# Patient Record
Sex: FEMALE | Race: WHITE | NOT HISPANIC OR LATINO | Employment: FULL TIME | URBAN - METROPOLITAN AREA
[De-identification: names, ages, dates, MRNs, and addresses within clinical notes are randomized per-mention and may not be internally consistent; named-entity substitution may affect disease eponyms.]

---

## 2017-01-09 ENCOUNTER — OFFICE VISIT (OUTPATIENT)
Dept: FAMILY MEDICINE CLINIC | Facility: CLINIC | Age: 37
End: 2017-01-09

## 2017-01-09 VITALS
HEART RATE: 99 BPM | WEIGHT: 192.7 LBS | SYSTOLIC BLOOD PRESSURE: 118 MMHG | OXYGEN SATURATION: 99 % | DIASTOLIC BLOOD PRESSURE: 82 MMHG | TEMPERATURE: 98.1 F | BODY MASS INDEX: 32.11 KG/M2 | HEIGHT: 65 IN

## 2017-01-09 DIAGNOSIS — J01.90 ACUTE NON-RECURRENT SINUSITIS, UNSPECIFIED LOCATION: Primary | ICD-10-CM

## 2017-01-09 DIAGNOSIS — I10 ESSENTIAL HYPERTENSION: Primary | ICD-10-CM

## 2017-01-09 PROCEDURE — 99213 OFFICE O/P EST LOW 20 MIN: CPT | Performed by: INTERNAL MEDICINE

## 2017-01-09 RX ORDER — SULFAMETHOXAZOLE AND TRIMETHOPRIM 800; 160 MG/1; MG/1
1 TABLET ORAL 2 TIMES DAILY
Qty: 20 TABLET | Refills: 0 | Status: SHIPPED | OUTPATIENT
Start: 2017-01-09 | End: 2017-05-22

## 2017-01-09 NOTE — PROGRESS NOTES
"CC: Cough and congestion    Onset 12/22 with scratchy throat, but worse over past 4 days with purulent nasal d/c and sputum.  Now with cough. Trying Mucinex and Dayquil with minimal relief; Delsym help temporarily last night.    No fever or chills.    Trouble sleeping due to cough.  No SOa.    Never smoked.    Allergies   Allergen Reactions   • Mastisol [Wound Dressing Adhesive] Itching   • Levofloxacin Rash   • Oxycodone Rash       Current Outpatient Prescriptions:   •  triamterene-hydrochlorothiazide (MAXZIDE-25) 37.5-25 MG per tablet, Take 1 tablet by mouth daily. (Patient taking differently: Take 1 tablet by mouth Daily. Take 1/2 tablet daily), Disp: 90 tablet, Rfl: 3  •  valACYclovir (VALTREX) 1000 MG tablet, TAKE 2 TABLETS AT THE ONSET OF A COLD SORE AND THEN 12 HOURS LATER TAKE 2 TABLETS.  NOT TO EXCEED 4 TABLETS PER OUTBREAK, Disp: 21 tablet, Rfl: 1  •  gabapentin (NEURONTIN) 300 MG capsule, Take 1 capsule by mouth 2 (two) times a day. (Patient taking differently: Take 300 mg by mouth every night.), Disp: 60 capsule, Rfl: 4    Visit Vitals   • /82 (BP Location: Left arm, Patient Position: Sitting, Cuff Size: Large Adult)   • Pulse 99   • Temp 98.1 °F (36.7 °C) (Oral)   • Ht 65\" (165.1 cm)   • Wt 192 lb 11.2 oz (87.4 kg)   • SpO2 99%   • Breastfeeding No   • BMI 32.07 kg/m2     Physical Exam   Constitutional: She is oriented to person, place, and time and well-developed, well-nourished, and in no distress.   HENT:   Mouth/Throat: No oropharyngeal exudate.   Tympanic membranes are normal bilaterally.  Boggy turbinates bilaterally with thick drainage on the left and clear drainage on the right.  Cobblestoning of the oropharynx with thick white drainage posteriorly.  No tonsillar exudates.   Eyes: Conjunctivae are normal. No scleral icterus.   Cardiovascular: Normal rate, regular rhythm and normal heart sounds.    Pulmonary/Chest: Effort normal and breath sounds normal.   Lymphadenopathy:        Head " (right side): No preauricular and no posterior auricular adenopathy present.        Head (left side): No preauricular and no posterior auricular adenopathy present.     She has no cervical adenopathy.        Right: No supraclavicular adenopathy present.        Left: No supraclavicular adenopathy present.   Neurological: She is alert and oriented to person, place, and time.         Aislinn was seen today for sinus problem.    Diagnoses and all orders for this visit:    Acute non-recurrent sinusitis, unspecified location    Other orders  -     sulfamethoxazole-trimethoprim (BACTRIM DS,SEPTRA DS) 800-160 MG per tablet; Take 1 tablet by mouth 2 (Two) Times a Day.    She should use saline nasal rinses at least 4 times a day and every hour if she needs it that often.  Delsym to help with the cough, and she can continue taking the Mucinex with a full glass of water twice a day.  Take the Bactrim as prescribed.  Call if symptoms worsen or if new symptoms develop, or if her symptoms persist beyond the antibiotic course.

## 2017-01-09 NOTE — MR AVS SNAPSHOT
Aislinn Marie   1/9/2017 1:00 PM   Office Visit    Dept Phone:  [phone]   Encounter #:  [id number]    Provider:  Aurelio Persaud MD   Department:  [facility] INTERNAL MEDICINE                Your Full Care Plan              Today's Medication Changes          These changes are accurate as of: 1/9/17  1:36 PM.  If you have any questions, ask your nurse or doctor.               New Medication(s)Ordered:     sulfamethoxazole-trimethoprim 800-160 MG per tablet   Commonly known as:  BACTRIM DS,SEPTRA DS   Take 1 tablet by mouth 2 (Two) Times a Day.   Started by:  Aurelio Persaud MD         Stop taking medication(s)listed here:     Scopolamine 1.5 MG/3DAYS patch   Commonly known as:  TRANSDERM-SCOP (1.5 MG)   Stopped by:  Aurelio Persaud MD                Where to Get Your Medications      These medications [facility] [address] [phone] [facility] [phone] [place] [address]     Phone:  [phone]     sulfamethoxazole-trimethoprim 800-160 MG per tablet                  Your Updated Medication List          This list is accurate as of: 1/9/17  1:36 PM.  Always use your most recent med list.                gabapentin 300 MG capsule   Commonly known as:  NEURONTIN   Take 1 capsule by mouth 2 (two) times a day.       sulfamethoxazole-trimethoprim 800-160 MG per tablet   Commonly known as:  BACTRIM DS,SEPTRA DS   Take 1 tablet by mouth 2 (Two) Times a Day.       triamterene-hydrochlorothiazide 37.5-25 MG per tablet   Commonly known as:  MAXZIDE-25   Take 1 tablet by mouth daily.       valACYclovir 1000 MG tablet   Commonly known as:  VALTREX   TAKE 2 TABLETS AT THE ONSET OF A COLD SORE AND THEN 12 HOURS LATER TAKE 2 TABLETS.  NOT TO EXCEED 4 TABLETS PER OUTBREAK               You Were Diagnosed With        Codes Comments    Acute non-recurrent sinusitis,  "unspecified location    -  Primary ICD-10-CM: J01.90  ICD-9-CM: 461.9       Instructions     None    Patient Instructions History      Upcoming Appointments     Visit Type Date Time Department    ACUTE           1/9/2017  1:00 PM Service at Home [facility]    [facility] 5/16/2017 11:40 AM [facility]    OFF[facility] VISIT 5/22/2017  8:20 AM [facility]      MyChart Signup     Our records indicate that you have an active [facility] [employer] account.    You can view your After Visit Summary by going to [email or web address] and logging in with your Ch[employer] username and password.  If you don't have a [employer] username and password but a parent or guardian has access to your record, the parent or guardian should login with their own [employer] username and password and access your record to view the After Visit Summary.    If you have questions, you can email [email or web address] or call [phone] to talk to our Ch[employer] staff.  Remember, Ch[employer] is NOT to be used for urgent needs.  For medical emergencies, dial 911.               Other Info from Your Visit           Your Appointments     May 16, 2017 11:40 AM EDT   [facility] with LUKE CROUCH   [facility] INTERNAL MEDICINE (--)    [address] [place], [place] [id number]   [phone]            May 22, 2017  8:20 AM EDT   Office Visit with Aurelio Persaud MD   [facility] INTERNAL MEDICINE (--)    [address] [place], [place] [id number]   [phone]           Arrive 15 minutes prior to appointment.              Allergies     Mastisol [Wound Dressing Adhesive]  Itching    Levofloxacin  Rash    Oxycodone  Rash      Reason for Visit     Sinus Problem           Vital Signs     Blood Pressure Pulse Temperature Height Weight    118/82 (BP Location: Left arm, Patient Position: Sitting, Cuff Size: Large Adult) 99 98.1 °F (36.7 °C) (Oral) 65\" (165.1 cm) 192 lb 11.2 oz (87.4 kg)    " Oxygen Saturation Breastfeeding? Body Mass Index Smoking Status       99% No 32.07 kg/m2 Never Smoker       Problems and Diagnoses Noted     Acute non-recurrent sinusitis, unspecified location    -  Primary

## 2017-01-13 ENCOUNTER — TELEPHONE (OUTPATIENT)
Dept: FAMILY MEDICINE CLINIC | Facility: CLINIC | Age: 37
End: 2017-01-13

## 2017-01-13 RX ORDER — FLUCONAZOLE 150 MG/1
TABLET ORAL
Qty: 2 TABLET | Refills: 0 | Status: SHIPPED | OUTPATIENT
Start: 2017-01-13 | End: 2017-11-22

## 2017-01-13 NOTE — TELEPHONE ENCOUNTER
----- Message from Akilah Linda MA sent at 1/13/2017 11:48 AM EST -----  Regarding: FW: MEDICATION  Contact: [phone]  Please advise    ----- Message -----     From: Reshma Quiles     Sent: 1/13/2017  10:21 AM       To: Akilah Linda MA  Subject: MEDICATION                                       Patient called stating she was in the other day for a sinus infection. Dr. Persaud gave patient and antibiotic. She still has 5 days left of this but has developed a yeast infection and would like something to be called in for this.  Please advise.  Thank you.

## 2017-05-01 ENCOUNTER — RESULTS ENCOUNTER (OUTPATIENT)
Dept: FAMILY MEDICINE CLINIC | Facility: CLINIC | Age: 37
End: 2017-05-01

## 2017-05-01 DIAGNOSIS — I10 ESSENTIAL HYPERTENSION: ICD-10-CM

## 2017-05-10 DIAGNOSIS — I10 ESSENTIAL HYPERTENSION: Primary | ICD-10-CM

## 2017-05-15 ENCOUNTER — RESULTS ENCOUNTER (OUTPATIENT)
Dept: FAMILY MEDICINE CLINIC | Facility: CLINIC | Age: 37
End: 2017-05-15

## 2017-05-15 DIAGNOSIS — I10 ESSENTIAL HYPERTENSION: ICD-10-CM

## 2017-05-16 ENCOUNTER — LAB (OUTPATIENT)
Dept: LAB | Facility: HOSPITAL | Age: 37
End: 2017-05-16

## 2017-05-16 DIAGNOSIS — I10 ESSENTIAL HYPERTENSION, MALIGNANT: Primary | ICD-10-CM

## 2017-05-16 LAB
ALBUMIN SERPL-MCNC: 4 G/DL (ref 3.5–5.2)
ALBUMIN/GLOB SERPL: 1.3 G/DL
ALP SERPL-CCNC: 68 U/L (ref 39–117)
ALT SERPL W P-5'-P-CCNC: 40 U/L (ref 1–33)
ANION GAP SERPL CALCULATED.3IONS-SCNC: 10.9 MMOL/L
AST SERPL-CCNC: 26 U/L (ref 1–32)
BILIRUB SERPL-MCNC: 0.2 MG/DL (ref 0.1–1.2)
BUN BLD-MCNC: 14 MG/DL (ref 6–20)
BUN/CREAT SERPL: 16.9 (ref 7–25)
CALCIUM SPEC-SCNC: 9.2 MG/DL (ref 8.6–10.5)
CHLORIDE SERPL-SCNC: 102 MMOL/L (ref 98–107)
CO2 SERPL-SCNC: 28.1 MMOL/L (ref 22–29)
CREAT BLD-MCNC: 0.83 MG/DL (ref 0.57–1)
GFR SERPL CREATININE-BSD FRML MDRD: 77 ML/MIN/1.73
GLOBULIN UR ELPH-MCNC: 3.2 GM/DL
GLUCOSE BLD-MCNC: 102 MG/DL (ref 65–99)
POTASSIUM BLD-SCNC: 3.7 MMOL/L (ref 3.5–5.2)
PROT SERPL-MCNC: 7.2 G/DL (ref 6–8.5)
SODIUM BLD-SCNC: 141 MMOL/L (ref 136–145)

## 2017-05-16 PROCEDURE — 36415 COLL VENOUS BLD VENIPUNCTURE: CPT

## 2017-05-16 PROCEDURE — 80053 COMPREHEN METABOLIC PANEL: CPT

## 2017-05-22 ENCOUNTER — OFFICE VISIT (OUTPATIENT)
Dept: FAMILY MEDICINE CLINIC | Facility: CLINIC | Age: 37
End: 2017-05-22

## 2017-05-22 VITALS
DIASTOLIC BLOOD PRESSURE: 82 MMHG | HEIGHT: 65 IN | OXYGEN SATURATION: 99 % | BODY MASS INDEX: 32.4 KG/M2 | HEART RATE: 95 BPM | SYSTOLIC BLOOD PRESSURE: 124 MMHG | WEIGHT: 194.5 LBS

## 2017-05-22 DIAGNOSIS — R73.01 IFG (IMPAIRED FASTING GLUCOSE): ICD-10-CM

## 2017-05-22 DIAGNOSIS — I10 ESSENTIAL HYPERTENSION: Primary | ICD-10-CM

## 2017-05-22 DIAGNOSIS — Z13.6 SCREENING FOR CARDIOVASCULAR CONDITION: ICD-10-CM

## 2017-05-22 LAB
EXPIRATION DATE: ABNORMAL
HBA1C MFR BLD: 4.9 % (ref 4.8–5.6)
Lab: ABNORMAL

## 2017-05-22 PROCEDURE — 36416 COLLJ CAPILLARY BLOOD SPEC: CPT | Performed by: INTERNAL MEDICINE

## 2017-05-22 PROCEDURE — 83036 HEMOGLOBIN GLYCOSYLATED A1C: CPT | Performed by: INTERNAL MEDICINE

## 2017-05-22 PROCEDURE — 99213 OFFICE O/P EST LOW 20 MIN: CPT | Performed by: INTERNAL MEDICINE

## 2017-07-11 DIAGNOSIS — I10 ESSENTIAL HYPERTENSION: ICD-10-CM

## 2017-07-11 RX ORDER — TRIAMTERENE AND HYDROCHLOROTHIAZIDE 37.5; 25 MG/1; MG/1
TABLET ORAL
Qty: 90 TABLET | Refills: 2 | Status: SHIPPED | OUTPATIENT
Start: 2017-07-11 | End: 2018-05-23

## 2017-11-16 ENCOUNTER — LAB (OUTPATIENT)
Dept: LAB | Facility: HOSPITAL | Age: 37
End: 2017-11-16

## 2017-11-16 DIAGNOSIS — Z13.6 SCREENING FOR CARDIOVASCULAR CONDITION: ICD-10-CM

## 2017-11-16 DIAGNOSIS — R73.01 IFG (IMPAIRED FASTING GLUCOSE): ICD-10-CM

## 2017-11-16 DIAGNOSIS — I10 ESSENTIAL HYPERTENSION: ICD-10-CM

## 2017-11-16 LAB
ALBUMIN SERPL-MCNC: 4.2 G/DL (ref 3.5–5.2)
ALBUMIN/GLOB SERPL: 1.3 G/DL
ALP SERPL-CCNC: 68 U/L (ref 39–117)
ALT SERPL W P-5'-P-CCNC: 20 U/L (ref 1–33)
ANION GAP SERPL CALCULATED.3IONS-SCNC: 11.9 MMOL/L
AST SERPL-CCNC: 17 U/L (ref 1–32)
BILIRUB SERPL-MCNC: 0.4 MG/DL (ref 0.1–1.2)
BUN BLD-MCNC: 12 MG/DL (ref 6–20)
BUN/CREAT SERPL: 15.2 (ref 7–25)
CALCIUM SPEC-SCNC: 9.5 MG/DL (ref 8.6–10.5)
CHLORIDE SERPL-SCNC: 102 MMOL/L (ref 98–107)
CHOLEST SERPL-MCNC: 166 MG/DL (ref 0–200)
CO2 SERPL-SCNC: 29.1 MMOL/L (ref 22–29)
CREAT BLD-MCNC: 0.79 MG/DL (ref 0.57–1)
GFR SERPL CREATININE-BSD FRML MDRD: 81 ML/MIN/1.73
GLOBULIN UR ELPH-MCNC: 3.2 GM/DL
GLUCOSE BLD-MCNC: 99 MG/DL (ref 65–99)
HDLC SERPL-MCNC: 58 MG/DL (ref 40–60)
LDLC SERPL CALC-MCNC: 97 MG/DL (ref 0–100)
LDLC/HDLC SERPL: 1.67 {RATIO}
POTASSIUM BLD-SCNC: 4.1 MMOL/L (ref 3.5–5.2)
PROT SERPL-MCNC: 7.4 G/DL (ref 6–8.5)
SODIUM BLD-SCNC: 143 MMOL/L (ref 136–145)
TRIGL SERPL-MCNC: 55 MG/DL (ref 0–150)
VLDLC SERPL-MCNC: 11 MG/DL (ref 5–40)

## 2017-11-16 PROCEDURE — 80061 LIPID PANEL: CPT

## 2017-11-16 PROCEDURE — 80053 COMPREHEN METABOLIC PANEL: CPT

## 2017-11-16 PROCEDURE — 36415 COLL VENOUS BLD VENIPUNCTURE: CPT

## 2017-11-22 ENCOUNTER — OFFICE VISIT (OUTPATIENT)
Dept: FAMILY MEDICINE CLINIC | Facility: CLINIC | Age: 37
End: 2017-11-22

## 2017-11-22 VITALS
OXYGEN SATURATION: 98 % | HEART RATE: 87 BPM | BODY MASS INDEX: 29.42 KG/M2 | SYSTOLIC BLOOD PRESSURE: 124 MMHG | DIASTOLIC BLOOD PRESSURE: 80 MMHG | WEIGHT: 176.6 LBS | HEIGHT: 65 IN

## 2017-11-22 DIAGNOSIS — Z23 NEED FOR HEPATITIS A VACCINATION: ICD-10-CM

## 2017-11-22 DIAGNOSIS — I10 ESSENTIAL HYPERTENSION: Primary | ICD-10-CM

## 2017-11-22 PROCEDURE — 90471 IMMUNIZATION ADMIN: CPT | Performed by: INTERNAL MEDICINE

## 2017-11-22 PROCEDURE — 99213 OFFICE O/P EST LOW 20 MIN: CPT | Performed by: INTERNAL MEDICINE

## 2017-11-22 PROCEDURE — 90632 HEPA VACCINE ADULT IM: CPT | Performed by: INTERNAL MEDICINE

## 2017-11-22 RX ORDER — VALACYCLOVIR HYDROCHLORIDE 1 G/1
1000 TABLET, FILM COATED ORAL 3 TIMES DAILY
Qty: 21 TABLET | Refills: 1 | Status: SHIPPED | OUTPATIENT
Start: 2017-11-22 | End: 2019-01-08 | Stop reason: SDUPTHER

## 2017-11-22 RX ORDER — PHENTERMINE HYDROCHLORIDE 37.5 MG/1
37.5 CAPSULE ORAL EVERY MORNING
Start: 2017-11-22 | End: 2018-05-23 | Stop reason: SDUPTHER

## 2017-11-22 NOTE — PROGRESS NOTES
Subjective   Aislinn Marie is a 37 y.o. female who presents today for:    Hypertension (6 month f/u & review labs)    History of Present Illness   Blood pressure was elevated at a gynecologist visit in March, 2016.  Repeat testing through our office visits showed persistent elevation, so she was started on Maxide 37.5/25.  She developed orthostatic symptoms, so was cut the dose in half in November, 2016.  Blood pressure was fine at her ov in 5/2017.  Blood pressure readings have been normal at home.  She has moved the dose to the afternoon to avoid mild orthostatic symptoms during the day.    Her father-in-law, who has end-stage liver disease due to alcoholism, was recently diagnosed with hepatitis a.  She is inquiring about getting the hepatitis A vaccine.    She started taking Phentermine in August for weight loss and has lost 20 lbs.      She works as a surgical assistant.    Ms. Marie  reports that she has never smoked. She has never used smokeless tobacco. She reports that she drinks alcohol. She reports that she does not use illicit drugs.     Allergies   Allergen Reactions   • Mastisol [Wound Dressing Adhesive] Itching   • Levofloxacin Rash   • Oxycodone Rash       Current Outpatient Prescriptions:   •  valACYclovir (VALTREX) 1000 MG tablet, TAKE 2 TABLETS AT THE ONSET OF A COLD SORE AND THEN 12 HOURS LATER TAKE 2 TABLETS.  NOT TO EXCEED 4 TABLETS PER OUTBREAK, Disp: 21 tablet, Rfl: 1  •  phentermine 37.5 MG capsule, Take 1 capsule by mouth Every Morning., Disp: , Rfl:   •  triamterene-hydrochlorothiazide (MAXZIDE-25) 37.5-25 MG per tablet, TAKE ONE TABLET BY MOUTH DAILY (Patient taking differently: TAKE 1/2 TABLET BY MOUTH DAILY), Disp: 90 tablet, Rfl: 2      Review of Systems   Constitutional:        20 lb weight loss by design   Eyes: Negative for visual disturbance.   Cardiovascular: Negative for chest pain.   Musculoskeletal: Negative for myalgias.   Neurological: Negative for light-headedness.  "      Objective   Vitals:    11/22/17 0827 11/22/17 0843   BP: 140/96 124/80   BP Location: Right arm Left arm   Patient Position: Sitting    Cuff Size: Large Adult Large Adult   Pulse: 87    SpO2: 98%    Weight: 176 lb 9.6 oz (80.1 kg)    Height: 65\" (165.1 cm)      Physical Exam   Constitutional: She is oriented to person, place, and time. She appears well-developed and well-nourished.   Eyes: Conjunctivae are normal. No scleral icterus.   Cardiovascular: Normal rate and regular rhythm.    Abdominal: Bowel sounds are normal.   Neurological: She is alert and oriented to person, place, and time.   Psychiatric: She has a normal mood and affect.       Assessment/Plan   Aislinn was seen today for hypertension.    Diagnoses and all orders for this visit:    Essential hypertension    Need for Hepatitis A vaccine    She is doing very well in this regard.  She will continue the regular exercise and the diet changes that have her weight down 20 pounds.  If she moves off phentermine and maintains her weight, we may be able to stop the blood pressure medicine altogether.  She will call us if she begins developing orthostatic hypotension symptoms again.  Otherwise we will see how she's doing in 6 months.    We will give her hepatitis A vaccine #2 in 6 months.    -     phentermine 37.5 MG capsule; Take 1 capsule by mouth Every Morning added to Rx list, prescribed by a weight loss clinic.    10 minutes of the 15 minute office visit were spent counseling the patient regarding phentermine's potential effect on her blood pressure; recommended course of treatment for phentermine; indications for hepatitis A vaccine; the indications, risks and benefits of hepatitis A vaccine.  "

## 2017-12-06 RX ORDER — VALACYCLOVIR HYDROCHLORIDE 1 G/1
TABLET, FILM COATED ORAL
Qty: 21 TABLET | Refills: 0 | OUTPATIENT
Start: 2017-12-06

## 2018-04-10 ENCOUNTER — OFFICE VISIT (OUTPATIENT)
Dept: OBSTETRICS AND GYNECOLOGY | Facility: CLINIC | Age: 38
End: 2018-04-10

## 2018-04-10 ENCOUNTER — PROCEDURE VISIT (OUTPATIENT)
Dept: OBSTETRICS AND GYNECOLOGY | Facility: CLINIC | Age: 38
End: 2018-04-10

## 2018-04-10 VITALS
WEIGHT: 177.2 LBS | DIASTOLIC BLOOD PRESSURE: 78 MMHG | BODY MASS INDEX: 29.52 KG/M2 | SYSTOLIC BLOOD PRESSURE: 128 MMHG | HEIGHT: 65 IN

## 2018-04-10 DIAGNOSIS — R10.2 PELVIC PAIN: ICD-10-CM

## 2018-04-10 DIAGNOSIS — Z12.31 VISIT FOR SCREENING MAMMOGRAM: ICD-10-CM

## 2018-04-10 DIAGNOSIS — Z01.419 ENCOUNTER FOR WELL WOMAN EXAM WITH ROUTINE GYNECOLOGICAL EXAM: ICD-10-CM

## 2018-04-10 DIAGNOSIS — R10.2 PELVIC PAIN IN FEMALE: ICD-10-CM

## 2018-04-10 DIAGNOSIS — Z00.00 ANNUAL PHYSICAL EXAM: Primary | ICD-10-CM

## 2018-04-10 PROCEDURE — 99213 OFFICE O/P EST LOW 20 MIN: CPT | Performed by: OBSTETRICS & GYNECOLOGY

## 2018-04-10 PROCEDURE — 76830 TRANSVAGINAL US NON-OB: CPT | Performed by: OBSTETRICS & GYNECOLOGY

## 2018-04-10 PROCEDURE — 99396 PREV VISIT EST AGE 40-64: CPT | Performed by: OBSTETRICS & GYNECOLOGY

## 2018-04-10 NOTE — PROGRESS NOTES
HPI   Aislinn Marie  is a 38 y.o. female who presents for evaluation of 2 issues.  First, she is overdue for her routine gynecologic exam.  Second, she has been experiencing pelvic pain and low back pain.  She reports that for the last 6-12 months, she has experienced a dull ache in the lower pelvis and lower back.  She reports that this occurs most days, though it is not happening today.  She reports that it is made slightly worse during her menstrual cycle.  She is uncertain whether it is worsened by intercourse.  She reports that her cycle remains regular and predictable.  This has not changed.  She denies fever or chills.  Denies nausea or vomiting.  Denies changes in bowel habits.    Chief Complaint   Patient presents with   • Gynecologic Exam     ae, reg periods, having lower abd pain moving into lower back daily, ? if essure causing pain       Past Medical History:   Diagnosis Date   • Arthritis    • Back pain    • Essential hypertension 4/7/2016   • Hemorrhoid    • Low back pain    • Lumbar disc herniation    • Ocular migraine    • Varicose veins        Past Surgical History:   Procedure Laterality Date   • CHOLECYSTECTOMY      Dr. Vasquez   • ENDOVENOUS ABLATION SAPHENOUS VEIN W/ LASER      Dr. Cárdenas   • ESSURE TUBAL LIGATION  2013    Dr. CANELO Saini   • LUMBAR DISCECTOMY FUSION INSTRUMENTATION Left 6/11/2016    Procedure: LUMBAR DISCECTOMY POSTERIOR WITH METRIX, Left L4/5, LEFT DECOMPRESSION L5/S1;  Surgeon: James Washburn MD;  Location: [facility];  Service:    • MICROAMBULATORY PHLEBECTOMY      Dr. Cárdenas   • NEUROFIBROMA EXCISION     • WISDOM TOOTH EXTRACTION         Social History     Social History   • Marital status:      Spouse name: N/A   • Number of children: N/A   • Years of education: N/A     Occupational History   • Not on file.     Social History Main Topics   • Smoking status: Never Smoker   • Smokeless tobacco: Never Used   • Alcohol use Yes      Comment: wine on a rare  occasion   • Drug use: No   • Sexual activity: Yes     Partners: Male      Comment: essure     Other Topics Concern   • Not on file     Social History Narrative   • No narrative on file       The following portions of the patient's history were reviewed and updated as appropriate: allergies, current medications, past family history, past medical history, past social history, past surgical history and problem list.    Review of Systems is positive for low back pain and pelvic pain.  It is negative for menorrhagia or dyspareunia.  All other systems are reviewed and are negative          Physical Exam   Constitutional: She is oriented to person, place, and time. She appears well-developed and well-nourished.   HENT:   Head: Normocephalic and atraumatic.   Cardiovascular: Normal rate and regular rhythm.    Pulmonary/Chest: Effort normal and breath sounds normal. She has no wheezes. She has no rales.   The breasts are equal in size.  There are no palpable lumps.  Nipple discharge and axillary adenopathy are absent.   Abdominal: Soft. She exhibits no distension. There is no tenderness.   Genitourinary: There is no lesion on the right labia. There is no lesion on the left labia.   Genitourinary Comments: Normal female external genitalia  The vagina is estrogenized and without visible lesion  Cervical motion tenderness is absent  The uterus is anteverted and slightly enlarged.  It is nontender.  No adnexal masses are palpable.  No adnexal tenderness.   Neurological: She is alert and oriented to person, place, and time.   Skin: Skin is warm and dry.   Nursing note and vitals reviewed.      Assessment    Aislinn was seen today for gynecologic exam.    Diagnoses and all orders for this visit:    Annual physical exam    Pelvic pain  -     US Non-ob Transvaginal        Plan  1. Normal gynecologic exam  2. Family history of breast cancer.  Counsel[place] regarding [place] [facility] [place] for mammography every 1-2 years between the ages  of 40 and 50 depending on patient preference and family history.  We discussed options and the patient would like to proceed with a mammogram.  We will help her to arrange this.  3. Pelvic pain and low back pain.  Counseled.  15 minutes of a 30 minute visit were spent in direct face-to-face counseling on this issue.  The physical examination is positive only for an anteverted and slightly enlarged uterus.  The patient is not tender over her adnexa.  She has several possible causes of her pain including a possible ovarian cyst or endometrial polyp.  The patient is concerned regarding her Essure being a possible source.  The Essure was placed in 2013 and the pain did not begin until 2017.  I feel that this is unlikely as a cause.  Other potential causes include worsening of the patient's chronic low back pain versus spastic colon, irritable bowel or inflammatory bowel disease.  We discussed all these possibilities.  Workup will begin with an ultrasound to visualize the endometrium, the adnexa.  If this is negative, we can follow up with a CT scan to visualize the issue or coils.  If all this is negative, I would recommend follow-up with a gastroenterologist for further workup.  If a gynecologic cause is found, attention will be focused on it.    4. No Follow-up on file.    History   Smoking Status   • Never Smoker   5.     6.

## 2018-04-12 ENCOUNTER — DOCUMENTATION (OUTPATIENT)
Dept: OBSTETRICS AND GYNECOLOGY | Facility: CLINIC | Age: 38
End: 2018-04-12

## 2018-04-12 DIAGNOSIS — R10.2 PELVIC PAIN: Primary | ICD-10-CM

## 2018-04-12 NOTE — PROGRESS NOTES
Phone call.  Ultrasound results were reviewed with the patient and her questions were answered.  The uterus is normal in size.  The endometrium is normal.  There are no adnexal masses.  The Essure coils are visualized on ultrasound.  This appears normal study.  We discussed the possibility that the patient's pelvic and low back pain could be an extension of her chronic low back pain or could also be related to a gastrointestinal abnormality such as irritable bowel syndrome inflammatory bowel syndrome or diverticular disease.  I recommended further workup in that area.  Prior to proceeding this, the patient would like to continue looking at her GYN system.  He can perform a CT scan to evaluate the uterus, adnexa and Essure coils.  The patient would like to proceed with this.

## 2018-04-13 LAB
CONV .: NORMAL
CYTOLOGIST CVX/VAG CYTO: NORMAL
CYTOLOGY CVX/VAG DOC THIN PREP: NORMAL
DX ICD CODE: NORMAL
HIV 1 & 2 AB SER-IMP: NORMAL
OTHER STN SPEC: NORMAL
PATH REPORT.FINAL DX SPEC: NORMAL
PATHOLOGIST CVX/VAG CYTO: NORMAL
STAT OF ADQ CVX/VAG CYTO-IMP: NORMAL

## 2018-04-18 ENCOUNTER — TRANSCRIBE ORDERS (OUTPATIENT)
Dept: ADMINISTRATIVE | Facility: HOSPITAL | Age: 38
End: 2018-04-18

## 2018-04-18 DIAGNOSIS — R10.2 ADNEXAL TENDERNESS, RIGHT: Primary | ICD-10-CM

## 2018-04-19 ENCOUNTER — HOSPITAL ENCOUNTER (OUTPATIENT)
Dept: CT IMAGING | Facility: HOSPITAL | Age: 38
Discharge: HOME | End: 2018-04-19
Attending: OBSTETRICS & GYNECOLOGY | Admitting: OBSTETRICS & GYNECOLOGY

## 2018-04-19 ENCOUNTER — APPOINTMENT (OUTPATIENT)
Dept: CT IMAGING | Facility: HOSPITAL | Age: 38
End: 2018-04-19
Attending: OBSTETRICS & GYNECOLOGY

## 2018-04-19 DIAGNOSIS — R10.2 ADNEXAL TENDERNESS, RIGHT: ICD-10-CM

## 2018-04-19 LAB — CREAT BLDA-MCNC: 0.8 MG/DL (ref 0.6–1.3)

## 2018-04-19 PROCEDURE — 82565 ASSAY OF CREATININE: CPT

## 2018-04-19 PROCEDURE — 72193 CT PELVIS W/DYE: CPT

## 2018-04-19 PROCEDURE — 25010000002 IOPAMIDOL 61 % SOLUTION: Performed by: OBSTETRICS & GYNECOLOGY

## 2018-04-19 RX ADMIN — IOPAMIDOL 85 ML: 612 INJECTION, SOLUTION INTRAVENOUS at 07:35

## 2018-04-24 ENCOUNTER — DOCUMENTATION (OUTPATIENT)
Dept: OBSTETRICS AND GYNECOLOGY | Facility: CLINIC | Age: 38
End: 2018-04-24

## 2018-04-24 NOTE — PROGRESS NOTES
Phone call.  CT results were reviewed and discussed with the patient.  The uterus was normal.  Issue coils are in good position.  There is no pathology encountered on CT scan.  We discussed the possibility of a GI consult regarding possible inflammatory bowel disease, irritable bowel syndrome or diverticular disease.  The patient reports that her pain has completely resolved and she declines any further workup for now.

## 2018-04-26 ENCOUNTER — HOSPITAL ENCOUNTER (OUTPATIENT)
Dept: MAMMOGRAPHY | Facility: HOSPITAL | Age: 38
Discharge: HOME | End: 2018-04-26
Attending: OBSTETRICS & GYNECOLOGY | Admitting: OBSTETRICS & GYNECOLOGY

## 2018-04-26 DIAGNOSIS — R10.2 PELVIC PAIN: ICD-10-CM

## 2018-04-26 DIAGNOSIS — Z12.31 VISIT FOR SCREENING MAMMOGRAM: ICD-10-CM

## 2018-04-26 PROCEDURE — 77067 SCR MAMMO BI INCL CAD: CPT

## 2018-04-30 DIAGNOSIS — N64.89 BREAST ASYMMETRY: Primary | ICD-10-CM

## 2018-05-08 ENCOUNTER — APPOINTMENT (OUTPATIENT)
Dept: MAMMOGRAPHY | Facility: HOSPITAL | Age: 38
End: 2018-05-08
Attending: OBSTETRICS & GYNECOLOGY

## 2018-05-16 ENCOUNTER — HOSPITAL ENCOUNTER (OUTPATIENT)
Dept: MAMMOGRAPHY | Facility: HOSPITAL | Age: 38
Discharge: HOME | End: 2018-05-16
Attending: OBSTETRICS & GYNECOLOGY | Admitting: OBSTETRICS & GYNECOLOGY

## 2018-05-16 DIAGNOSIS — N64.89 BREAST ASYMMETRY: ICD-10-CM

## 2018-05-16 PROCEDURE — 77065 DX MAMMO INCL CAD UNI: CPT

## 2018-05-23 ENCOUNTER — OFFICE VISIT (OUTPATIENT)
Dept: FAMILY MEDICINE CLINIC | Facility: CLINIC | Age: 38
End: 2018-05-23

## 2018-05-23 VITALS
WEIGHT: 176.7 LBS | HEIGHT: 65 IN | HEART RATE: 79 BPM | OXYGEN SATURATION: 99 % | SYSTOLIC BLOOD PRESSURE: 134 MMHG | DIASTOLIC BLOOD PRESSURE: 80 MMHG | BODY MASS INDEX: 29.44 KG/M2

## 2018-05-23 DIAGNOSIS — Z23 NEED FOR HEPATITIS A VACCINATION: ICD-10-CM

## 2018-05-23 DIAGNOSIS — I10 ESSENTIAL HYPERTENSION: Primary | ICD-10-CM

## 2018-05-23 DIAGNOSIS — E66.9 OBESITY (BMI 30-39.9): ICD-10-CM

## 2018-05-23 PROCEDURE — 90471 IMMUNIZATION ADMIN: CPT | Performed by: INTERNAL MEDICINE

## 2018-05-23 PROCEDURE — 99213 OFFICE O/P EST LOW 20 MIN: CPT | Performed by: INTERNAL MEDICINE

## 2018-05-23 PROCEDURE — 90632 HEPA VACCINE ADULT IM: CPT | Performed by: INTERNAL MEDICINE

## 2018-05-23 RX ORDER — PHENTERMINE HYDROCHLORIDE 37.5 MG/1
37.5 CAPSULE ORAL EVERY MORNING
Qty: 30 CAPSULE | Refills: 0 | Status: SHIPPED | OUTPATIENT
Start: 2018-05-23 | End: 2018-06-28 | Stop reason: SDUPTHER

## 2018-06-28 ENCOUNTER — OFFICE VISIT (OUTPATIENT)
Dept: FAMILY MEDICINE CLINIC | Facility: CLINIC | Age: 38
End: 2018-06-28

## 2018-06-28 VITALS
BODY MASS INDEX: 28.79 KG/M2 | HEART RATE: 100 BPM | DIASTOLIC BLOOD PRESSURE: 92 MMHG | SYSTOLIC BLOOD PRESSURE: 138 MMHG | HEIGHT: 65 IN | OXYGEN SATURATION: 98 % | WEIGHT: 172.8 LBS

## 2018-06-28 DIAGNOSIS — E66.9 OBESITY (BMI 30-39.9): ICD-10-CM

## 2018-06-28 DIAGNOSIS — I10 ESSENTIAL HYPERTENSION: Primary | ICD-10-CM

## 2018-06-28 PROCEDURE — 99214 OFFICE O/P EST MOD 30 MIN: CPT | Performed by: INTERNAL MEDICINE

## 2018-06-28 RX ORDER — TRIAMTERENE AND HYDROCHLOROTHIAZIDE 37.5; 25 MG/1; MG/1
1 TABLET ORAL DAILY
Qty: 90 TABLET | Refills: 1 | Status: SHIPPED | OUTPATIENT
Start: 2018-06-28 | End: 2022-01-17

## 2018-06-28 RX ORDER — PHENTERMINE HYDROCHLORIDE 37.5 MG/1
37.5 CAPSULE ORAL EVERY MORNING
Qty: 30 CAPSULE | Refills: 2 | Status: SHIPPED | OUTPATIENT
Start: 2018-06-28 | End: 2019-10-02

## 2018-06-28 NOTE — PROGRESS NOTES
"Subjective   Aislinn Marie is a 38 y.o. female who presents today for:    Hypertension (1 month f/u) and Weight Loss    History of Present Illness     She had persistent elevations in her blood pressure following a gynecologist visit in 3/2016.  We started Maxide 37.5/25, but she developed orthostatic symptoms.  We cut the dose to one half tablet in 11/2016 with good blood pressure control thereafter.    She started taking phentermine and 8/2017 for weight loss, lost 20 pounds initially, and was able to come off the Maxide in 1/2018.  She was taking phentermine sporadically through the first half of this year.  When we saw her in late May, we put her back on phentermine once a day.  She has lost 4 pounds in these past 4 weeks.  She denies any untoward side effects from the phentermine.    Ms. Marie  reports that she has never smoked. She has never used smokeless tobacco. She reports that she drinks alcohol. She reports that she does not use drugs.     Allergies   Allergen Reactions   • Mastisol [Wound Dressing Adhesive] Itching   • Levofloxacin Rash   • Oxycodone Rash       Current Outpatient Prescriptions:   •  phentermine 37.5 MG capsule, Take 1 capsule by mouth Every Morning., Disp: 30 capsule, Rfl: 0  •  valACYclovir (VALTREX) 1000 MG tablet, Take 1 tablet by mouth 3 (Three) Times a Day. (Patient taking differently: Take 1,000 mg by mouth 3 (Three) Times a Day As Needed.), Disp: 21 tablet, Rfl: 1      Review of Systems   Constitutional: Negative for unexpected weight change.   Cardiovascular: Negative for chest pain and palpitations.   Psychiatric/Behavioral: Negative for sleep disturbance.         Objective   Vitals:    06/28/18 1349   BP: 138/92   BP Location: Right arm   Patient Position: Sitting   Cuff Size: Adult   Pulse: 100   SpO2: 98%   Weight: 78.4 kg (172 lb 12.8 oz)   Height: 165.1 cm (65\")     Physical Exam    Well-developed, well-nourished, in good spirits.  Regular rate and rhythm.  No murmur " appreciated.  Mood is upbeat and affect is appropriate.      Aislnin was seen today for hypertension and weight loss.    Diagnoses and all orders for this visit:    Essential hypertension- uncontrolled  -     triamterene-hydrochlorothiazide (MAXZIDE-25) 37.5-25 MG per tablet; Take 1 tablet by mouth Daily.    Obesity (BMI 30-39.9)  -     phentermine 37.5 MG capsule; Take 1 capsule by mouth Every Morning.    Resume the Maxide, starting with one half tablet.  We may need to increase it to a full tablet at some point down the road.  However, one half tablet has been effective for her in the past, and with continued weight loss, and the resumption of regular exercise that was encouraged today, she should be able to stick with this low dose and maybe even come off of it again.  She has done well with this dose in the past.  Labs from November, 2017, when she was taking one half of a Maxide were reviewed today and all were in order.    RACH report was obtained and reviewed during the course of today's office visit.  This shows that she is getting the phentermine has prescribed from us only.  She has responded this past month, so we will attenuate for the next 2 months.  In September, she will cut back to every other day prior to our follow-up visit at the end of September.  She knows to contact us in the interim if there are any problems otherwise.

## 2018-09-26 ENCOUNTER — OFFICE VISIT (OUTPATIENT)
Dept: FAMILY MEDICINE CLINIC | Facility: CLINIC | Age: 38
End: 2018-09-26

## 2018-09-26 VITALS
OXYGEN SATURATION: 98 % | BODY MASS INDEX: 28.14 KG/M2 | HEART RATE: 86 BPM | SYSTOLIC BLOOD PRESSURE: 128 MMHG | HEIGHT: 65 IN | DIASTOLIC BLOOD PRESSURE: 74 MMHG | WEIGHT: 168.9 LBS

## 2018-09-26 DIAGNOSIS — I10 ESSENTIAL HYPERTENSION: Primary | ICD-10-CM

## 2018-09-26 DIAGNOSIS — Z79.899 ENCOUNTER FOR LONG-TERM (CURRENT) USE OF MEDICATIONS: ICD-10-CM

## 2018-09-26 DIAGNOSIS — E66.9 OBESITY (BMI 30-39.9): ICD-10-CM

## 2018-09-26 LAB
BUN SERPL-MCNC: 11 MG/DL (ref 6–20)
BUN/CREAT SERPL: 12.1 (ref 7–25)
CALCIUM SERPL-MCNC: 9.7 MG/DL (ref 8.6–10.5)
CHLORIDE SERPL-SCNC: 100 MMOL/L (ref 98–107)
CO2 SERPL-SCNC: 32 MMOL/L (ref 22–29)
CREAT SERPL-MCNC: 0.91 MG/DL (ref 0.57–1)
GLUCOSE SERPL-MCNC: 84 MG/DL (ref 65–99)
POTASSIUM SERPL-SCNC: 3.6 MMOL/L (ref 3.5–5.2)
SODIUM SERPL-SCNC: 141 MMOL/L (ref 136–145)

## 2018-09-26 PROCEDURE — 99213 OFFICE O/P EST LOW 20 MIN: CPT | Performed by: INTERNAL MEDICINE

## 2018-09-26 RX ORDER — NITROFURANTOIN 25; 75 MG/1; MG/1
100 CAPSULE ORAL 2 TIMES DAILY
COMMUNITY
Start: 2018-09-24 | End: 2018-09-28

## 2018-09-26 NOTE — PROGRESS NOTES
Subjective   Aislinn Marie is a 38 y.o. female who presents today for:    Hypertension (3 month f/u ) and Weight Loss (CSE)    History of Present Illness     She has chronic, benign, essential hypertension that has improved with weight loss.  She takes Maxide, having cut the dose back to one half tablet daily with good control.  She checks her blood pressures at home.  She denies any side effects from the medication.    She has also struggled with her weight.  She was tried on phentermine with good benefit.  She then came off of it, started to regain weight, so restarted it earlier this year.  She is now weaning off of it, having lost about 30 pounds over the last 2 years.  She only has trouble with insomnia if she takes it late in the evening.    Ms. Marie  reports that she has never smoked. She has never used smokeless tobacco. She reports that she drinks alcohol. She reports that she does not use drugs.     Allergies   Allergen Reactions   • Mastisol [Wound Dressing Adhesive] Itching   • Levofloxacin Rash   • Oxycodone Rash       Current Outpatient Prescriptions:   •  nitrofurantoin, macrocrystal-monohydrate, (MACROBID) 100 MG capsule, Take 100 mg by mouth 2 (Two) Times a Day., Disp: , Rfl:   •  phentermine 37.5 MG capsule, Take 1 capsule by mouth Every Morning., Disp: 30 capsule, Rfl: 2  •  triamterene-hydrochlorothiazide (MAXZIDE-25) 37.5-25 MG per tablet, Take 1 tablet by mouth Daily., Disp: 90 tablet, Rfl: 1  •  valACYclovir (VALTREX) 1000 MG tablet, Take 1 tablet by mouth 3 (Three) Times a Day. (Patient taking differently: Take 1,000 mg by mouth 3 (Three) Times a Day As Needed.), Disp: 21 tablet, Rfl: 1      Review of Systems   Constitutional: Negative for unexpected weight change.   Cardiovascular: Positive for leg swelling (Intermittent, late day swelling). Negative for palpitations.   Psychiatric/Behavioral: Negative for sleep disturbance.         Objective   Vitals:    09/26/18 0948 09/26/18 1002  "  BP: 126/80 128/74   BP Location: Right arm Left arm   Patient Position: Sitting    Cuff Size: Adult Adult   Pulse: 86    SpO2: 98%    Weight: 76.6 kg (168 lb 14.4 oz)    Height: 165.1 cm (65\")      Physical Exam  Well-developed, well-nourished, in good spirits.  Mood is upbeat and affect is appropriate.  Insight and judgment are intact.  No thyromegaly or mass.  No carotid bruit.  Regular rate and rhythm.  No murmur noted.  Normal S1 and S2.  No lower extremity edema.        Aislinn was seen today for hypertension and weight loss.    Diagnoses and all orders for this visit:    Essential hypertension  -     Basic Metabolic Panel  Blood pressure is doing well on the Maxide (one half tablet once daily), and with the lifestyle changes that have her weight down 30 pounds over the last 2 years.  She will continue to monitor it at home.    Obesity (BMI 30-39.9), now doing better at 28.1  To wean off the phentermine this month.  Call if the weight starts to climb and crosses the 172 pound lucia.  If that's the case, we will restart phentermine, see her in follow-up one month later, and proceed from there, anticipating treating her for another 3 months before weaning off of it again.        "

## 2018-10-01 ENCOUNTER — APPOINTMENT (OUTPATIENT)
Dept: CT IMAGING | Facility: HOSPITAL | Age: 38
End: 2018-10-01

## 2018-10-01 ENCOUNTER — HOSPITAL ENCOUNTER (EMERGENCY)
Facility: HOSPITAL | Age: 38
Discharge: HOME | End: 2018-10-02
Attending: EMERGENCY MEDICINE | Admitting: EMERGENCY MEDICINE

## 2018-10-01 DIAGNOSIS — R10.10 UPPER ABDOMINAL PAIN: Primary | ICD-10-CM

## 2018-10-01 LAB
ALBUMIN SERPL-MCNC: 4.3 G/DL (ref 3.5–5.2)
ALBUMIN/GLOB SERPL: 1.3 G/DL
ALP SERPL-CCNC: 62 U/L (ref 39–117)
ALT SERPL W P-5'-P-CCNC: 14 U/L (ref 1–33)
ANION GAP SERPL CALCULATED.3IONS-SCNC: 12.1 MMOL/L
AST SERPL-CCNC: 17 U/L (ref 1–32)
BASOPHILS # BLD AUTO: 0.02 10*3/MM3 (ref 0–0.2)
BASOPHILS NFR BLD AUTO: 0.2 % (ref 0–1.5)
BILIRUB SERPL-MCNC: 0.5 MG/DL (ref 0.1–1.2)
BUN BLD-MCNC: 11 MG/DL (ref 6–20)
BUN/CREAT SERPL: 14.9 (ref 7–25)
CALCIUM SPEC-SCNC: 10 MG/DL (ref 8.6–10.5)
CHLORIDE SERPL-SCNC: 101 MMOL/L (ref 98–107)
CO2 SERPL-SCNC: 27.9 MMOL/L (ref 22–29)
CREAT BLD-MCNC: 0.74 MG/DL (ref 0.57–1)
DEPRECATED RDW RBC AUTO: 38.9 FL (ref 37–54)
DRUGS UR: NORMAL
EOSINOPHIL # BLD AUTO: 0.04 10*3/MM3 (ref 0–0.7)
EOSINOPHIL NFR BLD AUTO: 0.4 % (ref 0.3–6.2)
ERYTHROCYTE [DISTWIDTH] IN BLOOD BY AUTOMATED COUNT: 12.1 % (ref 11.7–13)
GFR SERPL CREATININE-BSD FRML MDRD: 87 ML/MIN/1.73
GLOBULIN UR ELPH-MCNC: 3.2 GM/DL
GLUCOSE BLD-MCNC: 92 MG/DL (ref 65–99)
HCT VFR BLD AUTO: 36.7 % (ref 35.6–45.5)
HGB BLD-MCNC: 13 G/DL (ref 11.9–15.5)
IMM GRANULOCYTES # BLD: 0.02 10*3/MM3 (ref 0–0.03)
IMM GRANULOCYTES NFR BLD: 0.2 % (ref 0–0.5)
LIPASE SERPL-CCNC: 39 U/L (ref 13–60)
LYMPHOCYTES # BLD AUTO: 2.18 10*3/MM3 (ref 0.9–4.8)
LYMPHOCYTES NFR BLD AUTO: 24.3 % (ref 19.6–45.3)
MCH RBC QN AUTO: 31 PG (ref 26.9–32)
MCHC RBC AUTO-ENTMCNC: 35.4 G/DL (ref 32.4–36.3)
MCV RBC AUTO: 87.4 FL (ref 80.5–98.2)
MONOCYTES # BLD AUTO: 0.53 10*3/MM3 (ref 0.2–1.2)
MONOCYTES NFR BLD AUTO: 5.9 % (ref 5–12)
NEUTROPHILS # BLD AUTO: 6.21 10*3/MM3 (ref 1.9–8.1)
NEUTROPHILS NFR BLD AUTO: 69.2 % (ref 42.7–76)
PLATELET # BLD AUTO: 233 10*3/MM3 (ref 140–500)
PMV BLD AUTO: 10 FL (ref 6–12)
POTASSIUM BLD-SCNC: 3.7 MMOL/L (ref 3.5–5.2)
PROT SERPL-MCNC: 7.5 G/DL (ref 6–8.5)
RBC # BLD AUTO: 4.2 10*6/MM3 (ref 3.9–5.2)
SODIUM BLD-SCNC: 141 MMOL/L (ref 136–145)
WBC NRBC COR # BLD: 8.98 10*3/MM3 (ref 4.5–10.7)

## 2018-10-01 PROCEDURE — 25010000002 IOPAMIDOL 61 % SOLUTION: Performed by: EMERGENCY MEDICINE

## 2018-10-01 PROCEDURE — 93010 ELECTROCARDIOGRAM REPORT: CPT | Performed by: INTERNAL MEDICINE

## 2018-10-01 PROCEDURE — 25010000002 ONDANSETRON PER 1 MG: Performed by: EMERGENCY MEDICINE

## 2018-10-01 PROCEDURE — 74177 CT ABD & PELVIS W/CONTRAST: CPT

## 2018-10-01 PROCEDURE — 96375 TX/PRO/DX INJ NEW DRUG ADDON: CPT

## 2018-10-01 PROCEDURE — 80053 COMPREHEN METABOLIC PANEL: CPT | Performed by: EMERGENCY MEDICINE

## 2018-10-01 PROCEDURE — 93005 ELECTROCARDIOGRAM TRACING: CPT | Performed by: EMERGENCY MEDICINE

## 2018-10-01 PROCEDURE — 83690 ASSAY OF LIPASE: CPT | Performed by: EMERGENCY MEDICINE

## 2018-10-01 PROCEDURE — 96374 THER/PROPH/DIAG INJ IV PUSH: CPT

## 2018-10-01 PROCEDURE — 85025 COMPLETE CBC W/AUTO DIFF WBC: CPT | Performed by: EMERGENCY MEDICINE

## 2018-10-01 PROCEDURE — 96376 TX/PRO/DX INJ SAME DRUG ADON: CPT

## 2018-10-01 PROCEDURE — 25010000002 HYDROMORPHONE PER 4 MG: Performed by: EMERGENCY MEDICINE

## 2018-10-01 PROCEDURE — 25010000002 MORPHINE PER 10 MG: Performed by: EMERGENCY MEDICINE

## 2018-10-01 PROCEDURE — 99284 EMERGENCY DEPT VISIT MOD MDM: CPT

## 2018-10-01 PROCEDURE — 93005 ELECTROCARDIOGRAM TRACING: CPT

## 2018-10-01 RX ORDER — ONDANSETRON 2 MG/ML
4 INJECTION INTRAMUSCULAR; INTRAVENOUS ONCE
Status: COMPLETED | OUTPATIENT
Start: 2018-10-01 | End: 2018-10-01

## 2018-10-01 RX ORDER — ALUMINA, MAGNESIA, AND SIMETHICONE 2400; 2400; 240 MG/30ML; MG/30ML; MG/30ML
15 SUSPENSION ORAL ONCE
Status: COMPLETED | OUTPATIENT
Start: 2018-10-01 | End: 2018-10-01

## 2018-10-01 RX ORDER — SODIUM CHLORIDE 0.9 % (FLUSH) 0.9 %
10 SYRINGE (ML) INJECTION AS NEEDED
Status: DISCONTINUED | OUTPATIENT
Start: 2018-10-01 | End: 2018-10-02 | Stop reason: HOSPADM

## 2018-10-01 RX ADMIN — ONDANSETRON 4 MG: 2 INJECTION INTRAMUSCULAR; INTRAVENOUS at 20:52

## 2018-10-01 RX ADMIN — ONDANSETRON 4 MG: 2 INJECTION INTRAMUSCULAR; INTRAVENOUS at 21:46

## 2018-10-01 RX ADMIN — MORPHINE SULFATE 4 MG: 4 INJECTION INTRAVENOUS at 20:55

## 2018-10-01 RX ADMIN — HYDROMORPHONE HYDROCHLORIDE 1 MG: 1 INJECTION, SOLUTION INTRAMUSCULAR; INTRAVENOUS; SUBCUTANEOUS at 21:46

## 2018-10-01 RX ADMIN — LIDOCAINE HYDROCHLORIDE 15 ML: 20 SOLUTION ORAL; TOPICAL at 20:42

## 2018-10-01 RX ADMIN — ALUMINUM HYDROXIDE, MAGNESIUM HYDROXIDE, AND DIMETHICONE 15 ML: 400; 400; 40 SUSPENSION ORAL at 20:42

## 2018-10-01 RX ADMIN — IOPAMIDOL 85 ML: 612 INJECTION, SOLUTION INTRAVENOUS at 22:34

## 2018-10-02 VITALS
RESPIRATION RATE: 18 BRPM | SYSTOLIC BLOOD PRESSURE: 126 MMHG | OXYGEN SATURATION: 100 % | BODY MASS INDEX: 27.66 KG/M2 | HEART RATE: 69 BPM | TEMPERATURE: 98 F | DIASTOLIC BLOOD PRESSURE: 88 MMHG | WEIGHT: 166 LBS | HEIGHT: 65 IN

## 2018-10-02 RX ORDER — ONDANSETRON 8 MG/1
8 TABLET, ORALLY DISINTEGRATING ORAL EVERY 8 HOURS PRN
Qty: 12 TABLET | Refills: 0 | Status: SHIPPED | OUTPATIENT
Start: 2018-10-02 | End: 2019-10-02

## 2018-10-02 RX ORDER — SUCRALFATE ORAL 1 G/10ML
1 SUSPENSION ORAL
Qty: 420 ML | Refills: 0 | Status: SHIPPED | OUTPATIENT
Start: 2018-10-02 | End: 2018-10-24 | Stop reason: HOSPADM

## 2018-10-02 RX ORDER — HYDROCODONE BITARTRATE AND ACETAMINOPHEN 5; 325 MG/1; MG/1
1 TABLET ORAL 4 TIMES DAILY PRN
Qty: 16 TABLET | Refills: 0 | Status: SHIPPED | OUTPATIENT
Start: 2018-10-02 | End: 2019-10-02

## 2018-10-02 NOTE — ED NOTES
"Pt states this am around 0530 she woke up with severe upper abdominal pain \"that hurt to even touch it\".  She took several antacids and felt somewhat better but pain has persisted.  She denies any n/v but does state that yesterday she noted that her stool was \"black and loose\"      Sandra Ng RN  10/01/18 2018    "

## 2018-10-02 NOTE — ED PROVIDER NOTES
EMERGENCY DEPARTMENT ENCOUNTER    CHIEF COMPLAINT  Chief Complaint: Abdominal pain  History given by: patient   History limited by: n/a2  Room Number: 22/22  PMD: Aurelio Persaud MD      HPI:  Pt is a 38 y.o. female who presents complaining of epigastric abd pain that began at 05:30 this morning. Pt states that the pain improved slightly over the course of the day, but has since worsened. She denies any known aggravating or alleviating factors. She denies nausea, vomiting, or any other sx. Hx of cholecystectomy.     Duration:  16 hours   Onset: gradual  Timing: constant   Location: epigastric abd   Radiation: none  Quality: pain  Intensity/Severity: moderate  Progression:worsening   Associated Symptoms: none  Aggravating Factors: none  Alleviating Factors: none      PAST MEDICAL HISTORY  Active Ambulatory Problems     Diagnosis Date Noted   • Lumbar radiculopathy 04/07/2016   • Chronic low back pain 04/07/2016   • LBP (low back pain) 04/07/2016   • Pain of left lower extremity 04/07/2016   • HNP (herniated nucleus pulposus), lumbar 04/07/2016   • Essential hypertension 04/07/2016   • Lumbar disc herniation with radiculopathy 06/08/2016   • Postoperative visit 06/24/2016     Resolved Ambulatory Problems     Diagnosis Date Noted   • No Resolved Ambulatory Problems     Past Medical History:   Diagnosis Date   • Arthritis    • Back pain    • Essential hypertension 4/7/2016   • Hemorrhoid    • Low back pain    • Lumbar disc herniation    • Ocular migraine    • Varicose veins        PAST SURGICAL HISTORY  Past Surgical History:   Procedure Laterality Date   • CHOLECYSTECTOMY      Dr. Vasquez   • ENDOVENOUS ABLATION SAPHENOUS VEIN W/ LASER      Dr. Cárdenas   • ESSURE TUBAL LIGATION  2013    Dr. CANELO Saini   • LUMBAR DISCECTOMY FUSION INSTRUMENTATION Left 6/11/2016    Procedure: LUMBAR DISCECTOMY POSTERIOR WITH METRIX, Left L4/5, LEFT DECOMPRESSION L5/S1;  Surgeon: James Washburn MD;  Location: [facility] OR;   Service:    • MICROAMBULATORY PHLEBECTOMY      Dr. Cárdenas   • NEUROFIBROMA EXCISION     • WISDOM TOOTH EXTRACTION         FAMILY HISTORY  Family History   Problem Relation Age of Onset   • Hypertension Mother    • Diabetes type II Mother    • Hypertension Maternal Grandmother    • Stroke Maternal Grandfather        SOCIAL HISTORY  Social History     Social History   • Marital status:      Spouse name: N/A   • Number of children: N/A   • Years of education: N/A     Occupational History   • Not on file.     Social History Main Topics   • Smoking status: Never Smoker   • Smokeless tobacco: Never Used   • Alcohol use Yes      Comment: wine on a rare occasion   • Drug use: No   • Sexual activity: Yes     Partners: Male      Comment: essure     Other Topics Concern   • Not on file     Social History Narrative   • No narrative on file       ALLERGIES  Mastisol [wound dressing adhesive]; Levofloxacin; and Oxycodone    REVIEW OF SYSTEMS  Review of Systems   Constitutional: Negative for fever.   HENT: Negative for sore throat.    Eyes: Negative.    Respiratory: Negative for cough and shortness of breath.    Cardiovascular: Negative for chest pain.   Gastrointestinal: Positive for abdominal pain. Negative for diarrhea and vomiting.   Genitourinary: Negative for dysuria.   Musculoskeletal: Negative for neck pain.   Skin: Negative for rash.   Allergic/Immunologic: Negative.    Neurological: Negative for weakness, numbness and headaches.   Hematological: Negative.    Psychiatric/Behavioral: Negative.    All other systems reviewed and are negative.      PHYSICAL EXAM  ED Triage Vitals   Temp Heart Rate Resp BP SpO2   10/01/18 1747 10/01/18 1747 10/01/18 1817 10/01/18 2010 10/01/18 1747   97.3 °F (36.3 °C) 99 16 138/97 100 %      Temp src Heart Rate Source Patient Position BP Location FiO2 (%)   10/01/18 1747 10/01/18 1747 10/01/18 2010 10/01/18 2010 --   Tympanic Monitor Lying Right arm        Physical Exam    Constitutional: She is oriented to person, place, and time. No distress.   HENT:   Head: Normocephalic and atraumatic.   Eyes: Pupils are equal, round, and reactive to light. EOM are normal.   Neck: Normal range of motion. Neck supple.   Cardiovascular: Normal rate, regular rhythm and normal heart sounds.    Pulmonary/Chest: Effort normal and breath sounds normal. No respiratory distress.   Abdominal: Soft. There is tenderness in the epigastric area. There is no rebound and no guarding.   Musculoskeletal: Normal range of motion. She exhibits no edema.   Neurological: She is alert and oriented to person, place, and time. She has normal sensation and normal strength.   Skin: Skin is warm and dry. No rash noted.   Psychiatric: Mood and affect normal.   Nursing note and vitals reviewed.      LAB RESULTS  Lab Results (last 24 hours)     Procedure Component Value Units Date/Time    CBC & Differential [[id number]] Collected:  10/01/18 2054    Specimen:  Blood Updated:  10/01/18 2117    Narrative:       The following orders were created for panel order CBC & Differential.  Procedure                               Abnormality         Status                     ---------                               -----------         ------                     CBC Auto Differential[[id number]]        Normal              Final result                 Please view results for these tests on the individual orders.    Comprehensive Metabolic Panel [[id number]] Collected:  10/01/18 2054    Specimen:  Blood Updated:  10/01/18 2134     Glucose 92 mg/dL      BUN 11 mg/dL      Creatinine 0.74 mg/dL      Sodium 141 mmol/L      Potassium 3.7 mmol/L      Chloride 101 mmol/L      CO2 27.9 mmol/L      Calcium 10.0 mg/dL      Total Protein 7.5 g/dL      Albumin 4.30 g/dL      ALT (SGPT) 14 U/L      AST (SGOT) 17 U/L      Alkaline Phosphatase 62 U/L      Total Bilirubin 0.5 mg/dL      eGFR Non African Amer 87 mL/min/1.73      Globulin 3.2 gm/dL      A/G Ratio  1.3 g/dL      BUN/Creatinine Ratio 14.9     Anion Gap 12.1 mmol/L     Lipase [[id number]]  (Normal) Collected:  10/01/18 2054    Specimen:  Blood Updated:  10/01/18 2134     Lipase 39 U/L     CBC Auto Differential [[id number]]  (Normal) Collected:  10/01/18 2054    Specimen:  Blood Updated:  10/01/18 2117     WBC 8.98 10*3/mm3      RBC 4.20 10*6/mm3      Hemoglobin 13.0 g/dL      Hematocrit 36.7 %      MCV 87.4 fL      MCH 31.0 pg      MCHC 35.4 g/dL      RDW 12.1 %      RDW-SD 38.9 fl      MPV 10.0 fL      Platelets 233 10*3/mm3      Neutrophil % 69.2 %      Lymphocyte % 24.3 %      Monocyte % 5.9 %      Eosinophil % 0.4 %      Basophil % 0.2 %      Immature Grans % 0.2 %      Neutrophils, Absolute 6.21 10*3/mm3      Lymphocytes, Absolute 2.18 10*3/mm3      Monocytes, Absolute 0.53 10*3/mm3      Eosinophils, Absolute 0.04 10*3/mm3      Basophils, Absolute 0.02 10*3/mm3      Immature Grans, Absolute 0.02 10*3/mm3           I ordered the above labs and reviewed the results    RADIOLOGY  CT Abdomen Pelvis With Contrast   Final Result       1. Mild hepatomegaly, as well as some mild dilatation of the common bile   duct. This may be postcholecystectomy nature, but correlation with liver   function tests is suggested, especially given history of epigastric   pain.       Radiation dose reduction techniques were utilized, including automated   exposure control and exposure modulation based on body size.       This report was finalized on 10/1/2018 11:12 PM by Dr. Melisa Lee M.D.               I ordered the above noted radiological studies. Interpreted by radiologist. Reviewed by me in PACS.       PROCEDURES  Procedures    EKG           EKG time: 17:52  Rhythm/Rate: NSR 75  P waves and OR: nml  QRS, axis: nml   ST and T waves: nml     Interpreted Contemporaneously by me, independently viewed  No prior      PROGRESS AND CONSULTS  ED Course as of Oct 02 0158   Mon Oct 01, 2018   2035 12 hour h/o epigastric pain  [EE]       ED Course User Index  [EE] Tobin Ng PA     21:34  BP- 138/97 HR- 99 Temp- 97.3 °F (36.3 °C) (Tympanic) O2 sat- 100%  Informed pt of the plan to treat pain. Will order CT abd/pelvis. Pt understands and agrees with the plan, all questions answered.    21:39  Dilaudid and Zofran ordered to treat pain. CT abd/pelvis ordered.     23:43  BP- 141/97 HR- 85 Temp- 97.3 °F (36.3 °C) (Tympanic) O2 sat- 100%  Rechecked the patient who is in NAD and is resting comfortably. Pt states that the pain has improved after the medication. Informed pt that the CT shows enlargement of the CBD while may just be post operative in nature. Labs show NAD. Informed her of the plan for d/c with referral to GI for f/u. Pt requests to be referred to Dr Chang for f/u. Pt understands and agrees with the plan, all questions answered.    MEDICAL DECISION MAKING  Results were reviewed/discussed with the patient and they were also made aware of online access. Pt also made aware that some labs, such as cultures, will not be resulted during ER visit and follow up with PMD is necessary.     MDM  Number of Diagnoses or Management Options     Amount and/or Complexity of Data Reviewed  Clinical lab tests: ordered and reviewed (ALT=14, AST=17)  Tests in the radiology section of CPT®: ordered and reviewed (CT abd/pelvis shows mild hepatomegaly, as well as some mild dilatation of the common bile duct. This may be postcholecystectomy nature  )  Tests in the medicine section of CPT®: ordered and reviewed (See EKG procedure note. )  Decide to obtain previous medical records or to obtain history from someone other than the patient: yes  Review and summarize past medical records: yes (No pertinent prior ED visits. )    Patient Progress  Patient progress: stable         DIAGNOSIS  Final diagnoses:   Upper abdominal pain       DISPOSITION  DISCHARGE    Patient discharged in stable condition.    Reviewed implications of results, diagnosis, meds,  responsibility to follow up, warning signs and symptoms of possible worsening, potential complications and reasons to return to ER.    Patient/Family voiced understanding of above instructions.    Discussed plan for discharge, as there is no emergent indication for admission. Patient referred to primary care provider for BP management due to today's BP. Pt/family is agreeable and understands need for follow up and repeat testing.  Pt is aware that discharge does not mean that nothing is wrong but it indicates no emergency is present that requires admission and they must continue care with follow-up as given below or physician of their choice.     FOLLOW-UP  Janessa Chang MD  [address] [address]  [phone]    Schedule an appointment as soon as possible for a visit            Medication List      New Prescriptions    HYDROcodone-acetaminophen 5-325 MG per tablet  Commonly known as:  NORCO  Take 1 tablet by mouth 4 (Four) Times a Day As Needed for Moderate Pain .     ondansetron ODT 8 MG disintegrating tablet  Commonly known as:  ZOFRAN ODT  Take 1 tablet by mouth Every 8 (Eight) Hours As Needed for Nausea or   Vomiting.     sucralfate 1 GM/10ML suspension  Commonly known as:  CARAFATE  Take 10 mL by mouth 4 (Four) Times a Day With Meals & at Bedtime.        Changed    valACYclovir 1000 MG tablet  Commonly known as:  VALTREX  Take 1 tablet by mouth 3 (Three) Times a Day.  What changed:  when to take this  reasons to take this          Latest Documented Vital Signs:  As of 1:58 AM  BP- 126/88 HR- 69 Temp- 98 °F (36.7 °C) (Oral) O2 sat- 100%    --  Documentation assistance provided by sandra Ryder for Dr Walsh.  Information recorded by the sandra was done at my direction and has been verified and validated by me.        Pooja Ryder  10/02/18 0008       Rian Walsh MD  10/02/18 0158

## 2018-10-09 ENCOUNTER — OFFICE VISIT (OUTPATIENT)
Dept: GASTROENTEROLOGY | Facility: CLINIC | Age: 38
End: 2018-10-09

## 2018-10-09 VITALS
BODY MASS INDEX: 27.99 KG/M2 | WEIGHT: 168 LBS | TEMPERATURE: 98.2 F | HEIGHT: 65 IN | SYSTOLIC BLOOD PRESSURE: 124 MMHG | DIASTOLIC BLOOD PRESSURE: 82 MMHG

## 2018-10-09 DIAGNOSIS — R10.30 LOWER ABDOMINAL PAIN: ICD-10-CM

## 2018-10-09 DIAGNOSIS — R10.10 UPPER ABDOMINAL PAIN: Primary | ICD-10-CM

## 2018-10-09 PROCEDURE — 99204 OFFICE O/P NEW MOD 45 MIN: CPT | Performed by: INTERNAL MEDICINE

## 2018-10-09 RX ORDER — SODIUM CHLORIDE, SODIUM LACTATE, POTASSIUM CHLORIDE, CALCIUM CHLORIDE 600; 310; 30; 20 MG/100ML; MG/100ML; MG/100ML; MG/100ML
30 INJECTION, SOLUTION INTRAVENOUS CONTINUOUS
Status: CANCELLED | OUTPATIENT
Start: 2018-10-24

## 2018-10-09 NOTE — PROGRESS NOTES
Chief Complaint   Patient presents with   • Follow-up   • Abdominal Pain     Subjective      HPI     Aislinn Marie is a 38 y.o. female who presents for evaluation of upper abdominal pain.  Seen in Er 1st October for these sx.  Pain just below xyphoid process.  Initially felt like gas pain.  Eventually presented to ER for evaluation.  CT scan with 9mm CBD. Prior CCY 2008.  No acute abnormalities.  LFTS all WNL.  Reports occasional NSAIDs.  Sent home on carafate.  No recurrence of pain since that time.    Does have chronic lower abdominal pain, etiology unclear.      Past Medical History:   Diagnosis Date   • Arthritis    • Back pain    • Essential hypertension 4/7/2016   • Hemorrhoid    • Low back pain    • Lumbar disc herniation    • Ocular migraine    • Varicose veins        Current Outpatient Prescriptions:   •  ondansetron ODT (ZOFRAN ODT) 8 MG disintegrating tablet, Take 1 tablet by mouth Every 8 (Eight) Hours As Needed for Nausea or Vomiting., Disp: 12 tablet, Rfl: 0  •  phentermine 37.5 MG capsule, Take 1 capsule by mouth Every Morning. (Patient taking differently: Take 37.5 mg by mouth Every Other Day.), Disp: 30 capsule, Rfl: 2  •  sucralfate (CARAFATE) 1 GM/10ML suspension, Take 10 mL by mouth 4 (Four) Times a Day With Meals & at Bedtime., Disp: 420 mL, Rfl: 0  •  triamterene-hydrochlorothiazide (MAXZIDE-25) 37.5-25 MG per tablet, Take 1 tablet by mouth Daily. (Patient taking differently: Take 0.5 tablets by mouth Daily.), Disp: 90 tablet, Rfl: 1  •  valACYclovir (VALTREX) 1000 MG tablet, Take 1 tablet by mouth 3 (Three) Times a Day. (Patient taking differently: Take 1,000 mg by mouth 3 (Three) Times a Day As Needed.), Disp: 21 tablet, Rfl: 1  •  HYDROcodone-acetaminophen (NORCO) 5-325 MG per tablet, Take 1 tablet by mouth 4 (Four) Times a Day As Needed for Moderate Pain ., Disp: 16 tablet, Rfl: 0     Allergies   Allergen Reactions   • Mastisol [Wound Dressing Adhesive] Itching   • Levofloxacin Rash   •  Oxycodone Rash     Social History     Social History   • Marital status:      Spouse name: N/A   • Number of children: N/A   • Years of education: N/A     Occupational History   • Not on file.     Social History Main Topics   • Smoking status: Never Smoker   • Smokeless tobacco: Never Used   • Alcohol use Yes      Comment: wine on a rare occasion   • Drug use: No   • Sexual activity: Yes     Partners: Male      Comment: essure     Other Topics Concern   • Not on file     Social History Narrative   • No narrative on file     Family History   Problem Relation Age of Onset   • Hypertension Mother    • Diabetes type II Mother    • Hypertension Maternal Grandmother    • Colon cancer Maternal Grandmother    • Stroke Maternal Grandfather      Review of Systems   Gastrointestinal: Positive for abdominal pain, constipation and nausea.   All other systems reviewed and are negative.       Objective   Vitals:    10/09/18 1417   BP: 124/82   Temp: 98.2 °F (36.8 °C)     Physical Exam   Constitutional: She is oriented to person, place, and time. She appears well-developed and well-nourished.   HENT:   Head: Normocephalic and atraumatic.   Mouth/Throat: Oropharynx is clear and moist.   Eyes: EOM are normal. No scleral icterus.   Neck: Normal range of motion. Neck supple. No thyromegaly present.   Cardiovascular: Normal rate, regular rhythm and normal heart sounds.  Exam reveals no gallop and no friction rub.    No murmur heard.  Pulmonary/Chest: Effort normal and breath sounds normal. She has no wheezes. She has no rales. She exhibits no tenderness.   Abdominal: Soft. Bowel sounds are normal. She exhibits no distension. There is no tenderness. There is no rebound and no guarding. No hernia.   Musculoskeletal: Normal range of motion. She exhibits no edema.   Lymphadenopathy:     She has no cervical adenopathy.   Neurological: She is alert and oriented to person, place, and time.   Skin: Skin is warm and dry.   Psychiatric:  She has a normal mood and affect. Judgment and thought content normal.   Vitals reviewed.       Assessment/Plan   Assessment:     1. Upper abdominal pain    2. Lower abdominal pain      Plan:   Recommend EGD for further evaluation of pts recent episode of upper abdominal pain  Will perform colonoscopy at same time given chronic lower abdominal pain  Start Dexilant and wean carafate        Lawson Krueger M.D.  [facility]  [address] [place] [address]  Office: [phone]

## 2018-10-24 ENCOUNTER — HOSPITAL ENCOUNTER (OUTPATIENT)
Facility: HOSPITAL | Age: 38
Setting detail: HOSPITAL OUTPATIENT SURGERY
Discharge: HOME | End: 2018-10-24
Attending: INTERNAL MEDICINE | Admitting: INTERNAL MEDICINE

## 2018-10-24 ENCOUNTER — ANESTHESIA EVENT (OUTPATIENT)
Dept: GASTROENTEROLOGY | Facility: HOSPITAL | Age: 38
End: 2018-10-24

## 2018-10-24 ENCOUNTER — ANESTHESIA (OUTPATIENT)
Dept: GASTROENTEROLOGY | Facility: HOSPITAL | Age: 38
End: 2018-10-24

## 2018-10-24 VITALS
RESPIRATION RATE: 16 BRPM | WEIGHT: 164.19 LBS | OXYGEN SATURATION: 100 % | SYSTOLIC BLOOD PRESSURE: 131 MMHG | TEMPERATURE: 97.9 F | DIASTOLIC BLOOD PRESSURE: 95 MMHG | HEIGHT: 65 IN | HEART RATE: 74 BPM | BODY MASS INDEX: 27.36 KG/M2

## 2018-10-24 DIAGNOSIS — R10.10 UPPER ABDOMINAL PAIN: ICD-10-CM

## 2018-10-24 DIAGNOSIS — R10.30 LOWER ABDOMINAL PAIN: ICD-10-CM

## 2018-10-24 LAB
B-HCG UR QL: NEGATIVE
INTERNAL NEGATIVE CONTROL: NEGATIVE
INTERNAL POSITIVE CONTROL: POSITIVE
Lab: NORMAL

## 2018-10-24 PROCEDURE — 81025 URINE PREGNANCY TEST: CPT | Performed by: INTERNAL MEDICINE

## 2018-10-24 PROCEDURE — 25010000002 PROPOFOL 10 MG/ML EMULSION: Performed by: ANESTHESIOLOGY

## 2018-10-24 PROCEDURE — 88312 SPECIAL STAINS GROUP 1: CPT | Performed by: INTERNAL MEDICINE

## 2018-10-24 PROCEDURE — 43239 EGD BIOPSY SINGLE/MULTIPLE: CPT | Performed by: INTERNAL MEDICINE

## 2018-10-24 PROCEDURE — 88305 TISSUE EXAM BY PATHOLOGIST: CPT | Performed by: INTERNAL MEDICINE

## 2018-10-24 PROCEDURE — 45385 COLONOSCOPY W/LESION REMOVAL: CPT | Performed by: INTERNAL MEDICINE

## 2018-10-24 RX ORDER — SODIUM CHLORIDE, SODIUM LACTATE, POTASSIUM CHLORIDE, CALCIUM CHLORIDE 600; 310; 30; 20 MG/100ML; MG/100ML; MG/100ML; MG/100ML
30 INJECTION, SOLUTION INTRAVENOUS CONTINUOUS
Status: DISCONTINUED | OUTPATIENT
Start: 2018-10-24 | End: 2018-10-24 | Stop reason: HOSPADM

## 2018-10-24 RX ORDER — PROPOFOL 10 MG/ML
VIAL (ML) INTRAVENOUS AS NEEDED
Status: DISCONTINUED | OUTPATIENT
Start: 2018-10-24 | End: 2018-10-24 | Stop reason: SURG

## 2018-10-24 RX ORDER — DEXLANSOPRAZOLE 60 MG/1
60 CAPSULE, DELAYED RELEASE ORAL DAILY
COMMUNITY
End: 2019-10-02

## 2018-10-24 RX ORDER — PROPOFOL 10 MG/ML
VIAL (ML) INTRAVENOUS CONTINUOUS PRN
Status: DISCONTINUED | OUTPATIENT
Start: 2018-10-24 | End: 2018-10-24 | Stop reason: SURG

## 2018-10-24 RX ORDER — LIDOCAINE HYDROCHLORIDE 20 MG/ML
INJECTION, SOLUTION INFILTRATION; PERINEURAL AS NEEDED
Status: DISCONTINUED | OUTPATIENT
Start: 2018-10-24 | End: 2018-10-24 | Stop reason: SURG

## 2018-10-24 RX ADMIN — PROPOFOL 50 MG: 10 INJECTION, EMULSION INTRAVENOUS at 15:21

## 2018-10-24 RX ADMIN — PROPOFOL 300 MCG/KG/MIN: 10 INJECTION, EMULSION INTRAVENOUS at 15:18

## 2018-10-24 RX ADMIN — PROPOFOL 120 MG: 10 INJECTION, EMULSION INTRAVENOUS at 15:18

## 2018-10-24 RX ADMIN — SODIUM CHLORIDE, POTASSIUM CHLORIDE, SODIUM LACTATE AND CALCIUM CHLORIDE 30 ML/HR: 600; 310; 30; 20 INJECTION, SOLUTION INTRAVENOUS at 14:14

## 2018-10-24 RX ADMIN — LIDOCAINE HYDROCHLORIDE 60 MG: 20 INJECTION, SOLUTION INFILTRATION; PERINEURAL at 15:18

## 2018-10-24 RX ADMIN — SODIUM CHLORIDE, POTASSIUM CHLORIDE, SODIUM LACTATE AND CALCIUM CHLORIDE: 600; 310; 30; 20 INJECTION, SOLUTION INTRAVENOUS at 15:15

## 2018-10-24 NOTE — H&P (VIEW-ONLY)
Chief Complaint   Patient presents with   • Follow-up   • Abdominal Pain     Subjective      HPI     Aislinn Marie is a 38 y.o. female who presents for evaluation of upper abdominal pain.  Seen in Er 1st October for these sx.  Pain just below xyphoid process.  Initially felt like gas pain.  Eventually presented to ER for evaluation.  CT scan with 9mm CBD. Prior CCY 2008.  No acute abnormalities.  LFTS all WNL.  Reports occasional NSAIDs.  Sent home on carafate.  No recurrence of pain since that time.    Does have chronic lower abdominal pain, etiology unclear.      Past Medical History:   Diagnosis Date   • Arthritis    • Back pain    • Essential hypertension 4/7/2016   • Hemorrhoid    • Low back pain    • Lumbar disc herniation    • Ocular migraine    • Varicose veins        Current Outpatient Prescriptions:   •  ondansetron ODT (ZOFRAN ODT) 8 MG disintegrating tablet, Take 1 tablet by mouth Every 8 (Eight) Hours As Needed for Nausea or Vomiting., Disp: 12 tablet, Rfl: 0  •  phentermine 37.5 MG capsule, Take 1 capsule by mouth Every Morning. (Patient taking differently: Take 37.5 mg by mouth Every Other Day.), Disp: 30 capsule, Rfl: 2  •  sucralfate (CARAFATE) 1 GM/10ML suspension, Take 10 mL by mouth 4 (Four) Times a Day With Meals & at Bedtime., Disp: 420 mL, Rfl: 0  •  triamterene-hydrochlorothiazide (MAXZIDE-25) 37.5-25 MG per tablet, Take 1 tablet by mouth Daily. (Patient taking differently: Take 0.5 tablets by mouth Daily.), Disp: 90 tablet, Rfl: 1  •  valACYclovir (VALTREX) 1000 MG tablet, Take 1 tablet by mouth 3 (Three) Times a Day. (Patient taking differently: Take 1,000 mg by mouth 3 (Three) Times a Day As Needed.), Disp: 21 tablet, Rfl: 1  •  HYDROcodone-acetaminophen (NORCO) 5-325 MG per tablet, Take 1 tablet by mouth 4 (Four) Times a Day As Needed for Moderate Pain ., Disp: 16 tablet, Rfl: 0     Allergies   Allergen Reactions   • Mastisol [Wound Dressing Adhesive] Itching   • Levofloxacin Rash   •  Oxycodone Rash     Social History     Social History   • Marital status:      Spouse name: N/A   • Number of children: N/A   • Years of education: N/A     Occupational History   • Not on file.     Social History Main Topics   • Smoking status: Never Smoker   • Smokeless tobacco: Never Used   • Alcohol use Yes      Comment: wine on a rare occasion   • Drug use: No   • Sexual activity: Yes     Partners: Male      Comment: essure     Other Topics Concern   • Not on file     Social History Narrative   • No narrative on file     Family History   Problem Relation Age of Onset   • Hypertension Mother    • Diabetes type II Mother    • Hypertension Maternal Grandmother    • Colon cancer Maternal Grandmother    • Stroke Maternal Grandfather      Review of Systems   Gastrointestinal: Positive for abdominal pain, constipation and nausea.   All other systems reviewed and are negative.       Objective   Vitals:    10/09/18 1417   BP: 124/82   Temp: 98.2 °F (36.8 °C)     Physical Exam   Constitutional: She is oriented to person, place, and time. She appears well-developed and well-nourished.   HENT:   Head: Normocephalic and atraumatic.   Mouth/Throat: Oropharynx is clear and moist.   Eyes: EOM are normal. No scleral icterus.   Neck: Normal range of motion. Neck supple. No thyromegaly present.   Cardiovascular: Normal rate, regular rhythm and normal heart sounds.  Exam reveals no gallop and no friction rub.    No murmur heard.  Pulmonary/Chest: Effort normal and breath sounds normal. She has no wheezes. She has no rales. She exhibits no tenderness.   Abdominal: Soft. Bowel sounds are normal. She exhibits no distension. There is no tenderness. There is no rebound and no guarding. No hernia.   Musculoskeletal: Normal range of motion. She exhibits no edema.   Lymphadenopathy:     She has no cervical adenopathy.   Neurological: She is alert and oriented to person, place, and time.   Skin: Skin is warm and dry.   Psychiatric:  She has a normal mood and affect. Judgment and thought content normal.   Vitals reviewed.       Assessment/Plan   Assessment:     1. Upper abdominal pain    2. Lower abdominal pain      Plan:   Recommend EGD for further evaluation of pts recent episode of upper abdominal pain  Will perform colonoscopy at same time given chronic lower abdominal pain  Start Dexilant and wean carafate        Lawson Krueger M.D.  [facility]  [address] [place] [address]  Office: [phone]

## 2018-10-24 NOTE — ANESTHESIA PREPROCEDURE EVALUATION
Anesthesia Evaluation     Patient summary reviewed and Nursing notes reviewed                Airway   Mallampati: II  TM distance: >3 FB  Neck ROM: full  No difficulty expected  Dental - normal exam     Pulmonary - normal exam   Cardiovascular - normal exam    (+) hypertension,       Neuro/Psych  (+) headaches, numbness,       ROS Comment: Ocular migraines  GI/Hepatic/Renal/Endo      Musculoskeletal     (+) back pain, chronic pain, radiculopathy Left lower extremity  Abdominal  - normal exam   Substance History      OB/GYN          Other   (+) arthritis                     Anesthesia Plan    ASA 2     MAC     intravenous induction   Anesthetic plan, all risks, benefits, and alternatives have been provided, discussed and informed consent has been obtained with: patient.

## 2018-10-26 LAB
CYTO UR: NORMAL
LAB AP CASE REPORT: NORMAL
PATH REPORT.FINAL DX SPEC: NORMAL
PATH REPORT.GROSS SPEC: NORMAL

## 2018-11-07 ENCOUNTER — TELEPHONE (OUTPATIENT)
Dept: GASTROENTEROLOGY | Facility: CLINIC | Age: 38
End: 2018-11-07

## 2018-11-07 RX ORDER — DEXLANSOPRAZOLE 60 MG/1
60 CAPSULE, DELAYED RELEASE ORAL
Qty: 90 CAPSULE | Refills: 2 | Status: SHIPPED | OUTPATIENT
Start: 2018-11-07 | End: 2019-10-02

## 2018-11-07 NOTE — TELEPHONE ENCOUNTER
Called pt and advised of the note from Dr Krueger. Advised that she will receive a reminder card in the mail when time for her scope. Pt verb understanding and asked for the Dexilant script be called in as she is out of samples. Med e-scribed.     Health Maintenance updated to reflect C/S due 10/2023.

## 2018-11-07 NOTE — TELEPHONE ENCOUNTER
----- Message from Lawson Krueger MD sent at 11/2/2018  1:49 PM EDT -----  Negative EGD bx  Colon polyp benign Recall 5yrs

## 2018-11-09 ENCOUNTER — PRIOR AUTHORIZATION (OUTPATIENT)
Dept: GASTROENTEROLOGY | Facility: CLINIC | Age: 38
End: 2018-11-09

## 2019-01-08 RX ORDER — VALACYCLOVIR HYDROCHLORIDE 1 G/1
TABLET, FILM COATED ORAL
Qty: 21 TABLET | Refills: 4 | Status: SHIPPED | OUTPATIENT
Start: 2019-01-08 | End: 2019-10-02

## 2019-03-11 ENCOUNTER — TRANSCRIBE ORDERS (OUTPATIENT)
Dept: ADMINISTRATIVE | Facility: HOSPITAL | Age: 39
End: 2019-03-11

## 2019-03-11 DIAGNOSIS — Z12.39 SCREENING BREAST EXAMINATION: Primary | ICD-10-CM

## 2019-04-30 ENCOUNTER — APPOINTMENT (OUTPATIENT)
Dept: MAMMOGRAPHY | Facility: HOSPITAL | Age: 39
End: 2019-04-30

## 2019-05-01 ENCOUNTER — HOSPITAL ENCOUNTER (OUTPATIENT)
Dept: MAMMOGRAPHY | Facility: HOSPITAL | Age: 39
Discharge: HOME | End: 2019-05-01
Admitting: OBSTETRICS & GYNECOLOGY

## 2019-05-01 DIAGNOSIS — Z12.39 SCREENING BREAST EXAMINATION: ICD-10-CM

## 2019-05-01 PROCEDURE — 77063 BREAST TOMOSYNTHESIS BI: CPT

## 2019-05-01 PROCEDURE — 77067 SCR MAMMO BI INCL CAD: CPT

## 2019-10-02 ENCOUNTER — APPOINTMENT (OUTPATIENT)
Dept: PREADMISSION TESTING | Facility: HOSPITAL | Age: 39
End: 2019-10-02

## 2019-10-02 VITALS
OXYGEN SATURATION: 100 % | WEIGHT: 167 LBS | RESPIRATION RATE: 16 BRPM | TEMPERATURE: 97 F | BODY MASS INDEX: 27.82 KG/M2 | HEIGHT: 65 IN | DIASTOLIC BLOOD PRESSURE: 87 MMHG | SYSTOLIC BLOOD PRESSURE: 140 MMHG | HEART RATE: 70 BPM

## 2019-10-02 LAB
ANION GAP SERPL CALCULATED.3IONS-SCNC: 14.6 MMOL/L (ref 5–15)
BUN BLD-MCNC: 20 MG/DL (ref 6–20)
BUN/CREAT SERPL: 26.7 (ref 7–25)
CALCIUM SPEC-SCNC: 9.2 MG/DL (ref 8.6–10.5)
CHLORIDE SERPL-SCNC: 104 MMOL/L (ref 98–107)
CO2 SERPL-SCNC: 25.4 MMOL/L (ref 22–29)
CREAT BLD-MCNC: 0.75 MG/DL (ref 0.57–1)
DEPRECATED RDW RBC AUTO: 42.1 FL (ref 37–54)
ERYTHROCYTE [DISTWIDTH] IN BLOOD BY AUTOMATED COUNT: 12.7 % (ref 12.3–15.4)
GFR SERPL CREATININE-BSD FRML MDRD: 85 ML/MIN/1.73
GLUCOSE BLD-MCNC: 83 MG/DL (ref 65–99)
HCT VFR BLD AUTO: 37.9 % (ref 34–46.6)
HGB BLD-MCNC: 12.8 G/DL (ref 12–15.9)
MCH RBC QN AUTO: 30.7 PG (ref 26.6–33)
MCHC RBC AUTO-ENTMCNC: 33.8 G/DL (ref 31.5–35.7)
MCV RBC AUTO: 90.9 FL (ref 79–97)
PLATELET # BLD AUTO: 222 10*3/MM3 (ref 140–450)
PMV BLD AUTO: 9.8 FL (ref 6–12)
POTASSIUM BLD-SCNC: 4.2 MMOL/L (ref 3.5–5.2)
RBC # BLD AUTO: 4.17 10*6/MM3 (ref 3.77–5.28)
SODIUM BLD-SCNC: 144 MMOL/L (ref 136–145)
WBC NRBC COR # BLD: 4.62 10*3/MM3 (ref 3.4–10.8)

## 2019-10-02 PROCEDURE — 85027 COMPLETE CBC AUTOMATED: CPT | Performed by: PLASTIC SURGERY

## 2019-10-02 PROCEDURE — 80048 BASIC METABOLIC PNL TOTAL CA: CPT | Performed by: PLASTIC SURGERY

## 2019-10-02 PROCEDURE — 93010 ELECTROCARDIOGRAM REPORT: CPT | Performed by: INTERNAL MEDICINE

## 2019-10-02 PROCEDURE — 36415 COLL VENOUS BLD VENIPUNCTURE: CPT

## 2019-10-02 PROCEDURE — 93005 ELECTROCARDIOGRAM TRACING: CPT

## 2019-10-02 RX ORDER — VALACYCLOVIR HYDROCHLORIDE 1 G/1
1000 TABLET, FILM COATED ORAL DAILY PRN
COMMUNITY

## 2019-10-02 NOTE — DISCHARGE INSTRUCTIONS
Take the following medications the morning of surgery with a small sip of water:    NONE      ARRIVE AT 6:30    General Instructions:  • Do not eat solid food after midnight the night before surgery.  • You may drink clear liquids day of surgery but must stop at least one hour before your hospital arrival time.  • It is beneficial for you to have a clear drink that contains carbohydrates the day of surgery.  We suggest a 12 to 20 ounce bottle of Gatorade or Powerade for non-diabetic patients or a 12 to 20 ounce bottle of G2 or Powerade Zero for diabetic patients. (Pediatric patients, are not advised to drink a 12 to 20 ounce carbohydrate drink)    Clear liquids are liquids you can see through.  Nothing red in color.     Plain water                               Sports drinks  Sodas                                   Gelatin (Jell-O)  Fruit juices without pulp such as white grape juice and apple juice  Popsicles that contain no fruit or yogurt  Tea or coffee (no cream or milk added)  Gatorade / Powerade  G2 / Powerade Zero    • Infants may have breast milk up to four hours before surgery.  • Infants drinking formula may drink formula up to six hours before surgery.   • Patients who avoid smoking, chewing tobacco and alcohol for 4 weeks prior to surgery have a reduced risk of post-operative complications.  Quit smoking as many days before surgery as you can.  • Do not smoke, use chewing tobacco or drink alcohol the day of surgery.   • If applicable bring your C-PAP/ BI-PAP machine.  • Bring any papers given to you in the doctor’s office.  • Wear clean comfortable clothes.  • Do not wear contact lenses, false eyelashes or make-up.  Bring a case for your glasses.   • Bring crutches or walker if applicable.  • Remove all piercings.  Leave jewelry and any other valuables at home.  • Hair extensions with metal clips must be removed prior to surgery.  • The Pre-Admission Testing nurse will instruct you to bring medications  if unable to obtain an accurate list in Pre-Admission Testing.        If you were given a blood bank ID arm band remember to bring it with you the day of surgery.    Preventing a Surgical Site Infection:  • For 2 to 3 days before surgery, avoid shaving with a razor because the razor can irritate skin and make it easier to develop an infection.    • Any areas of open skin can increase the risk of a post-operative wound infection by allowing bacteria to enter and travel throughout the body.  Notify your surgeon if you have any skin wounds / rashes even if it is not near the expected surgical site.  The area will need assessed to determine if surgery should be delayed until it is healed.  • The night prior to surgery sleep in a clean bed with clean clothing.  Do not allow pets to sleep with you.  • Shower on the morning of surgery using a fresh bar of anti-bacterial soap (such as Dial) and clean washcloth.  Dry with a clean towel and dress in clean clothing.  • Ask your surgeon if you will be receiving antibiotics prior to surgery.  • Make sure you, your family, and all healthcare providers clean their hands with soap and water or an alcohol based hand  before caring for you or your wound.    Day of surgery:  Your arrival time is approximately two hours before your scheduled surgery time.  Upon arrival, a Pre-op nurse and Anesthesiologist will review your health history, obtain vital signs, and answer questions you may have.  The only belongings needed at this time will be a list of your home medications and if applicable your C-PAP/BI-PAP machine.  If you are staying overnight your family can leave the rest of your belongings in the car and bring them to your room later.  A Pre-op nurse will start an IV and you may receive medication in preparation for surgery, including something to help you relax.  Your family will be able to see you in the Pre-op area.  Two visitors at a time will be allowed in the Pre-op  room.  While you are in surgery your family should notify the waiting room  if they leave the waiting room area and provide a contact phone number.    Please be aware that surgery does come with discomfort.  We want to make every effort to control your discomfort so please discuss any uncontrolled symptoms with your nurse.   Your doctor will most likely have prescribed pain medications.      If you are going home after surgery you will receive individualized written care instructions before being discharged.  A responsible adult must drive you to and from the hospital on the day of your surgery and stay with you for 24 hours.    If you are staying overnight following surgery, you will be transported to your hospital room following the recovery period.  [facility] has all private rooms.    You have received a list of surgical assistants for your reference.  If you have any questions please call Pre-Admission Testing at [phone].  Deductibles and co-payments are collected on the day of service. Please be prepared to pay the required co-pay, deductible or deposit on the day of service as defined by your plan.

## 2019-10-08 NOTE — H&P
Aislinn Marie is a 39-year-old woman with a long history of neurofibromatosis.  She has multiple previous excisions and how presents for excision of recurrence neurofibroma right medial thigh.    Medical history is positive for a cardiac arrhythmia during pregnancy but not since.  She also suffers from high blood pressure back pain and sinus problems.  She claims allergies to Levaquin and Percocet and had a reaction to Mastisol and her last surgery.    On examination she is 5 foot 5 inches tall and weighs 165 pounds.  She is alert and oriented and in no distress.  Chest is clear, heart sounds are normal with a dual rhythm and no bruits.  In the right medial thigh there is a bulging tender subcutaneous growth extending from the proximal anterior thigh down the medial thigh.  There is an old scar from previous surgery.    Diagnosis is recurrent neurofibroma right medial thigh and plan of management is excision.

## 2019-10-09 ENCOUNTER — HOSPITAL ENCOUNTER (OUTPATIENT)
Facility: HOSPITAL | Age: 39
Discharge: HOME | End: 2019-10-10
Attending: PLASTIC SURGERY | Admitting: PLASTIC SURGERY

## 2019-10-09 ENCOUNTER — ANESTHESIA (OUTPATIENT)
Dept: PERIOP | Facility: HOSPITAL | Age: 39
End: 2019-10-09

## 2019-10-09 ENCOUNTER — ANESTHESIA EVENT (OUTPATIENT)
Dept: PERIOP | Facility: HOSPITAL | Age: 39
End: 2019-10-09

## 2019-10-09 DIAGNOSIS — D36.13 NEUROFIBROMA OF THIGH: ICD-10-CM

## 2019-10-09 PROBLEM — Q85.00 NEUROFIBROMATOSIS: Status: ACTIVE | Noted: 2019-10-09

## 2019-10-09 PROCEDURE — 25010000002 FENTANYL CITRATE (PF) 100 MCG/2ML SOLUTION: Performed by: NURSE ANESTHETIST, CERTIFIED REGISTERED

## 2019-10-09 PROCEDURE — 25010000002 MORPHINE PER 10 MG: Performed by: PLASTIC SURGERY

## 2019-10-09 PROCEDURE — 25010000002 MORPHINE SULFATE (PF) 2 MG/ML SOLUTION: Performed by: PLASTIC SURGERY

## 2019-10-09 PROCEDURE — G0378 HOSPITAL OBSERVATION PER HR: HCPCS

## 2019-10-09 PROCEDURE — 81025 URINE PREGNANCY TEST: CPT | Performed by: ANESTHESIOLOGY

## 2019-10-09 PROCEDURE — 25010000002 MIDAZOLAM PER 1 MG: Performed by: ANESTHESIOLOGY

## 2019-10-09 PROCEDURE — 25010000002 DEXAMETHASONE PER 1 MG: Performed by: NURSE ANESTHETIST, CERTIFIED REGISTERED

## 2019-10-09 PROCEDURE — 25010000003 CEFAZOLIN 1-4 GM/50ML-% SOLUTION: Performed by: PLASTIC SURGERY

## 2019-10-09 PROCEDURE — 25010000002 PROPOFOL 10 MG/ML EMULSION: Performed by: NURSE ANESTHETIST, CERTIFIED REGISTERED

## 2019-10-09 PROCEDURE — 25010000002 ONDANSETRON PER 1 MG: Performed by: NURSE ANESTHETIST, CERTIFIED REGISTERED

## 2019-10-09 PROCEDURE — 25010000002 KETOROLAC TROMETHAMINE PER 15 MG: Performed by: NURSE ANESTHETIST, CERTIFIED REGISTERED

## 2019-10-09 PROCEDURE — 88305 TISSUE EXAM BY PATHOLOGIST: CPT | Performed by: PLASTIC SURGERY

## 2019-10-09 RX ORDER — LIDOCAINE HYDROCHLORIDE 20 MG/ML
INJECTION, SOLUTION INFILTRATION; PERINEURAL AS NEEDED
Status: DISCONTINUED | OUTPATIENT
Start: 2019-10-09 | End: 2019-10-09 | Stop reason: SURG

## 2019-10-09 RX ORDER — ACETAMINOPHEN 500 MG
500 TABLET ORAL ONCE
Status: COMPLETED | OUTPATIENT
Start: 2019-10-09 | End: 2019-10-09

## 2019-10-09 RX ORDER — DIPHENHYDRAMINE HCL 25 MG
25 CAPSULE ORAL
Status: DISCONTINUED | OUTPATIENT
Start: 2019-10-09 | End: 2019-10-09 | Stop reason: HOSPADM

## 2019-10-09 RX ORDER — SODIUM CHLORIDE 0.9 % (FLUSH) 0.9 %
3-10 SYRINGE (ML) INJECTION AS NEEDED
Status: DISCONTINUED | OUTPATIENT
Start: 2019-10-09 | End: 2019-10-09 | Stop reason: HOSPADM

## 2019-10-09 RX ORDER — ACETAMINOPHEN 325 MG/1
650 TABLET ORAL ONCE AS NEEDED
Status: DISCONTINUED | OUTPATIENT
Start: 2019-10-09 | End: 2019-10-09 | Stop reason: HOSPADM

## 2019-10-09 RX ORDER — FENTANYL CITRATE 50 UG/ML
50 INJECTION, SOLUTION INTRAMUSCULAR; INTRAVENOUS
Status: DISCONTINUED | OUTPATIENT
Start: 2019-10-09 | End: 2019-10-09 | Stop reason: HOSPADM

## 2019-10-09 RX ORDER — MORPHINE SULFATE 2 MG/ML
6 INJECTION, SOLUTION INTRAMUSCULAR; INTRAVENOUS
Status: DISCONTINUED | OUTPATIENT
Start: 2019-10-09 | End: 2019-10-10 | Stop reason: HOSPADM

## 2019-10-09 RX ORDER — GLYCOPYRROLATE 0.2 MG/ML
INJECTION INTRAMUSCULAR; INTRAVENOUS AS NEEDED
Status: DISCONTINUED | OUTPATIENT
Start: 2019-10-09 | End: 2019-10-09 | Stop reason: SURG

## 2019-10-09 RX ORDER — NALOXONE HCL 0.4 MG/ML
0.4 VIAL (ML) INJECTION
Status: DISCONTINUED | OUTPATIENT
Start: 2019-10-09 | End: 2019-10-10 | Stop reason: HOSPADM

## 2019-10-09 RX ORDER — TRIAMTERENE AND HYDROCHLOROTHIAZIDE 37.5; 25 MG/1; MG/1
1 TABLET ORAL DAILY
Status: DISCONTINUED | OUTPATIENT
Start: 2019-10-09 | End: 2019-10-10 | Stop reason: HOSPADM

## 2019-10-09 RX ORDER — BUPIVACAINE HYDROCHLORIDE AND EPINEPHRINE 5; 5 MG/ML; UG/ML
INJECTION, SOLUTION EPIDURAL; INTRACAUDAL; PERINEURAL AS NEEDED
Status: DISCONTINUED | OUTPATIENT
Start: 2019-10-09 | End: 2019-10-09 | Stop reason: HOSPADM

## 2019-10-09 RX ORDER — PROMETHAZINE HYDROCHLORIDE 25 MG/ML
6.25 INJECTION, SOLUTION INTRAMUSCULAR; INTRAVENOUS
Status: DISCONTINUED | OUTPATIENT
Start: 2019-10-09 | End: 2019-10-09 | Stop reason: HOSPADM

## 2019-10-09 RX ORDER — LABETALOL HYDROCHLORIDE 5 MG/ML
5 INJECTION, SOLUTION INTRAVENOUS
Status: DISCONTINUED | OUTPATIENT
Start: 2019-10-09 | End: 2019-10-09 | Stop reason: HOSPADM

## 2019-10-09 RX ORDER — DIPHENHYDRAMINE HYDROCHLORIDE 50 MG/ML
12.5 INJECTION INTRAMUSCULAR; INTRAVENOUS
Status: DISCONTINUED | OUTPATIENT
Start: 2019-10-09 | End: 2019-10-09 | Stop reason: HOSPADM

## 2019-10-09 RX ORDER — ACETAMINOPHEN 650 MG/1
650 SUPPOSITORY RECTAL ONCE AS NEEDED
Status: DISCONTINUED | OUTPATIENT
Start: 2019-10-09 | End: 2019-10-09 | Stop reason: HOSPADM

## 2019-10-09 RX ORDER — HYDROCODONE BITARTRATE AND ACETAMINOPHEN 7.5; 325 MG/1; MG/1
1 TABLET ORAL ONCE AS NEEDED
Status: COMPLETED | OUTPATIENT
Start: 2019-10-09 | End: 2019-10-09

## 2019-10-09 RX ORDER — FENTANYL CITRATE 50 UG/ML
INJECTION, SOLUTION INTRAMUSCULAR; INTRAVENOUS AS NEEDED
Status: DISCONTINUED | OUTPATIENT
Start: 2019-10-09 | End: 2019-10-09 | Stop reason: SURG

## 2019-10-09 RX ORDER — VALACYCLOVIR HYDROCHLORIDE 500 MG/1
1000 TABLET, FILM COATED ORAL DAILY PRN
Status: DISCONTINUED | OUTPATIENT
Start: 2019-10-09 | End: 2019-10-10 | Stop reason: HOSPADM

## 2019-10-09 RX ORDER — PROMETHAZINE HYDROCHLORIDE 25 MG/ML
12.5 INJECTION, SOLUTION INTRAMUSCULAR; INTRAVENOUS ONCE AS NEEDED
Status: DISCONTINUED | OUTPATIENT
Start: 2019-10-09 | End: 2019-10-09 | Stop reason: HOSPADM

## 2019-10-09 RX ORDER — FAMOTIDINE 10 MG/ML
20 INJECTION, SOLUTION INTRAVENOUS ONCE
Status: COMPLETED | OUTPATIENT
Start: 2019-10-09 | End: 2019-10-09

## 2019-10-09 RX ORDER — SODIUM CHLORIDE, SODIUM LACTATE, POTASSIUM CHLORIDE, CALCIUM CHLORIDE 600; 310; 30; 20 MG/100ML; MG/100ML; MG/100ML; MG/100ML
9 INJECTION, SOLUTION INTRAVENOUS CONTINUOUS
Status: DISCONTINUED | OUTPATIENT
Start: 2019-10-09 | End: 2019-10-10 | Stop reason: HOSPADM

## 2019-10-09 RX ORDER — NALOXONE HCL 0.4 MG/ML
0.2 VIAL (ML) INJECTION AS NEEDED
Status: DISCONTINUED | OUTPATIENT
Start: 2019-10-09 | End: 2019-10-09 | Stop reason: HOSPADM

## 2019-10-09 RX ORDER — MIDAZOLAM HYDROCHLORIDE 1 MG/ML
1 INJECTION INTRAMUSCULAR; INTRAVENOUS
Status: DISCONTINUED | OUTPATIENT
Start: 2019-10-09 | End: 2019-10-09 | Stop reason: HOSPADM

## 2019-10-09 RX ORDER — HYDROCODONE BITARTRATE AND ACETAMINOPHEN 7.5; 325 MG/1; MG/1
TABLET ORAL
Status: COMPLETED
Start: 2019-10-09 | End: 2019-10-09

## 2019-10-09 RX ORDER — PROPOFOL 10 MG/ML
VIAL (ML) INTRAVENOUS AS NEEDED
Status: DISCONTINUED | OUTPATIENT
Start: 2019-10-09 | End: 2019-10-09 | Stop reason: SURG

## 2019-10-09 RX ORDER — DOCUSATE SODIUM 100 MG/1
100 CAPSULE, LIQUID FILLED ORAL 2 TIMES DAILY PRN
Status: DISCONTINUED | OUTPATIENT
Start: 2019-10-09 | End: 2019-10-10 | Stop reason: HOSPADM

## 2019-10-09 RX ORDER — CEFAZOLIN SODIUM 1 G/50ML
1 INJECTION, SOLUTION INTRAVENOUS ONCE
Status: COMPLETED | OUTPATIENT
Start: 2019-10-09 | End: 2019-10-09

## 2019-10-09 RX ORDER — DEXAMETHASONE SODIUM PHOSPHATE 10 MG/ML
INJECTION INTRAMUSCULAR; INTRAVENOUS AS NEEDED
Status: DISCONTINUED | OUTPATIENT
Start: 2019-10-09 | End: 2019-10-09 | Stop reason: SURG

## 2019-10-09 RX ORDER — ONDANSETRON 2 MG/ML
INJECTION INTRAMUSCULAR; INTRAVENOUS AS NEEDED
Status: DISCONTINUED | OUTPATIENT
Start: 2019-10-09 | End: 2019-10-09 | Stop reason: SURG

## 2019-10-09 RX ORDER — MIDAZOLAM HYDROCHLORIDE 1 MG/ML
2 INJECTION INTRAMUSCULAR; INTRAVENOUS
Status: DISCONTINUED | OUTPATIENT
Start: 2019-10-09 | End: 2019-10-09 | Stop reason: HOSPADM

## 2019-10-09 RX ORDER — IBUPROFEN 400 MG/1
400 TABLET ORAL 2 TIMES DAILY
Status: DISCONTINUED | OUTPATIENT
Start: 2019-10-09 | End: 2019-10-10 | Stop reason: HOSPADM

## 2019-10-09 RX ORDER — EPHEDRINE SULFATE 50 MG/ML
5 INJECTION, SOLUTION INTRAVENOUS ONCE AS NEEDED
Status: DISCONTINUED | OUTPATIENT
Start: 2019-10-09 | End: 2019-10-09 | Stop reason: HOSPADM

## 2019-10-09 RX ORDER — FLUMAZENIL 0.1 MG/ML
0.2 INJECTION INTRAVENOUS AS NEEDED
Status: DISCONTINUED | OUTPATIENT
Start: 2019-10-09 | End: 2019-10-09 | Stop reason: HOSPADM

## 2019-10-09 RX ORDER — SODIUM CHLORIDE 0.9 % (FLUSH) 0.9 %
3 SYRINGE (ML) INJECTION EVERY 12 HOURS SCHEDULED
Status: DISCONTINUED | OUTPATIENT
Start: 2019-10-09 | End: 2019-10-09 | Stop reason: HOSPADM

## 2019-10-09 RX ORDER — ONDANSETRON 2 MG/ML
4 INJECTION INTRAMUSCULAR; INTRAVENOUS ONCE AS NEEDED
Status: COMPLETED | OUTPATIENT
Start: 2019-10-09 | End: 2019-10-09

## 2019-10-09 RX ORDER — GABAPENTIN 300 MG/1
600 CAPSULE ORAL ONCE
Status: COMPLETED | OUTPATIENT
Start: 2019-10-09 | End: 2019-10-09

## 2019-10-09 RX ORDER — KETOROLAC TROMETHAMINE 30 MG/ML
INJECTION, SOLUTION INTRAMUSCULAR; INTRAVENOUS AS NEEDED
Status: DISCONTINUED | OUTPATIENT
Start: 2019-10-09 | End: 2019-10-09 | Stop reason: SURG

## 2019-10-09 RX ORDER — HYDRALAZINE HYDROCHLORIDE 20 MG/ML
5 INJECTION INTRAMUSCULAR; INTRAVENOUS
Status: DISCONTINUED | OUTPATIENT
Start: 2019-10-09 | End: 2019-10-09 | Stop reason: HOSPADM

## 2019-10-09 RX ORDER — PROMETHAZINE HYDROCHLORIDE 25 MG/1
25 SUPPOSITORY RECTAL ONCE AS NEEDED
Status: DISCONTINUED | OUTPATIENT
Start: 2019-10-09 | End: 2019-10-09 | Stop reason: HOSPADM

## 2019-10-09 RX ORDER — PROMETHAZINE HYDROCHLORIDE 25 MG/1
25 TABLET ORAL ONCE AS NEEDED
Status: DISCONTINUED | OUTPATIENT
Start: 2019-10-09 | End: 2019-10-09 | Stop reason: HOSPADM

## 2019-10-09 RX ADMIN — GABAPENTIN 600 MG: 300 CAPSULE ORAL at 07:22

## 2019-10-09 RX ADMIN — FENTANYL CITRATE 50 MCG: 50 INJECTION INTRAMUSCULAR; INTRAVENOUS at 08:53

## 2019-10-09 RX ADMIN — FENTANYL CITRATE 50 MCG: 50 INJECTION INTRAMUSCULAR; INTRAVENOUS at 10:35

## 2019-10-09 RX ADMIN — PROPOFOL 200 MG: 10 INJECTION, EMULSION INTRAVENOUS at 08:46

## 2019-10-09 RX ADMIN — HYDROCODONE BITARTRATE AND ACETAMINOPHEN 1 TABLET: 7.5; 325 TABLET ORAL at 10:45

## 2019-10-09 RX ADMIN — ACETAMINOPHEN 500 MG: 500 TABLET, FILM COATED ORAL at 07:22

## 2019-10-09 RX ADMIN — FENTANYL CITRATE 50 MCG: 50 INJECTION INTRAMUSCULAR; INTRAVENOUS at 08:42

## 2019-10-09 RX ADMIN — MORPHINE SULFATE 6 MG: 2 INJECTION, SOLUTION INTRAMUSCULAR; INTRAVENOUS at 21:17

## 2019-10-09 RX ADMIN — MORPHINE SULFATE 6 MG: 2 INJECTION, SOLUTION INTRAMUSCULAR; INTRAVENOUS at 15:39

## 2019-10-09 RX ADMIN — KETOROLAC TROMETHAMINE 30 MG: 30 INJECTION, SOLUTION INTRAMUSCULAR; INTRAVENOUS at 09:46

## 2019-10-09 RX ADMIN — FENTANYL CITRATE 25 MCG: 50 INJECTION INTRAMUSCULAR; INTRAVENOUS at 09:23

## 2019-10-09 RX ADMIN — ONDANSETRON 4 MG: 2 INJECTION INTRAMUSCULAR; INTRAVENOUS at 09:32

## 2019-10-09 RX ADMIN — FENTANYL CITRATE 50 MCG: 50 INJECTION INTRAMUSCULAR; INTRAVENOUS at 08:48

## 2019-10-09 RX ADMIN — CEFAZOLIN SODIUM 1 G: 1 INJECTION, SOLUTION INTRAVENOUS at 08:48

## 2019-10-09 RX ADMIN — MIDAZOLAM 2 MG: 1 INJECTION INTRAMUSCULAR; INTRAVENOUS at 08:35

## 2019-10-09 RX ADMIN — FENTANYL CITRATE 50 MCG: 50 INJECTION INTRAMUSCULAR; INTRAVENOUS at 10:30

## 2019-10-09 RX ADMIN — DEXAMETHASONE SODIUM PHOSPHATE 8 MG: 10 INJECTION INTRAMUSCULAR; INTRAVENOUS at 08:48

## 2019-10-09 RX ADMIN — LIDOCAINE HYDROCHLORIDE 60 MG: 20 INJECTION, SOLUTION INFILTRATION; PERINEURAL at 08:46

## 2019-10-09 RX ADMIN — FAMOTIDINE 20 MG: 10 INJECTION, SOLUTION INTRAVENOUS at 07:23

## 2019-10-09 RX ADMIN — ONDANSETRON 4 MG: 2 INJECTION INTRAMUSCULAR; INTRAVENOUS at 10:30

## 2019-10-09 RX ADMIN — SODIUM CHLORIDE, POTASSIUM CHLORIDE, SODIUM LACTATE AND CALCIUM CHLORIDE 9 ML/HR: 600; 310; 30; 20 INJECTION, SOLUTION INTRAVENOUS at 07:26

## 2019-10-09 RX ADMIN — MORPHINE SULFATE 6 MG: 2 INJECTION, SOLUTION INTRAMUSCULAR; INTRAVENOUS at 11:50

## 2019-10-09 RX ADMIN — GLYCOPYRROLATE 0.1 MG: 0.2 INJECTION INTRAMUSCULAR; INTRAVENOUS at 08:42

## 2019-10-09 NOTE — OP NOTE
Lesion Excision Procedure Note    Indications: patient complains of a  large painful swelling on the anteromedial aspect of the right thigh which has been growing and producing discomfort.  She has had previous excisions but there is been further growth and residual neurofibroma.    Pre-operative Diagnosis: Recurrent neurofibromatosis right thigh    Post-operative Diagnosis:   Post-Op Diagnosis Codes:  Same    Surgeon: Arnel Chopra MD     Assistants: None    Anesthesia: General LMA anesthesia    ASA Class: 1    Procedure Details   The patient was seen in the Holding Room. The patient concurred with the proposed plan, giving informed consent.  The site of surgery properly noted/marked. The patient was taken to Operating Room, identified and staff verified the following Procedure(s):  EXCISION LARGE NEUROFIBROMA RIGHT THIGH.   A Time Out was held and the above information confirmed.    The patient was placed lying supine.  The thigh was prepped and draped in standard fashion.  One-half percent Marcaine with epinephrine was infiltrated under the skin surrounding a 28 x 10 cm lesion.  A vertical L-shaped incision was made to incorporate the old scar and the swelling anterior to the scar.  Sharp  dissection with the Bovie was used to excise the mass with the overlying skin.  Hemostasis was achieved with cautery.  Closure was achieved a with layered closure utilizing a 2-0 Vicryl sutures, Insorb dermal staples and a subcuticular 4-0 Vicryl..  Benzoin and Steri-Strips were applied and the thigh dressed with sterile gauze and coverall tape.  At the end of the operation, all sponge, instrument, and needle counts were correct.    Findings:  Extensive subcutaneous and intradermal neurofibromatosis    Estimated Blood Loss:  Minimal    Specimens:    Order Name Source Comment Collection Info Order Time   PREGNANCY, URINE    POCT. PRN for all menstruating females.  10/9/2019  7:16 AM   TISSUE PATHOLOGY EXAM Thigh, Right   Collected By: Arnel Chopra MD 10/9/2019  9:29 AM           Complications:  None; patient tolerated the procedure well.           Disposition: PACU - hemodynamically stable.           Condition: stable    Attending Attestation: I was present and scrubbed for the entire procedure.    Arnel Chopra MD

## 2019-10-09 NOTE — BRIEF OP NOTE
EXCISION MASS LOWER EXTREMITY  Progress Note    Aislinn Marie  10/9/2019    Pre-op Diagnosis:   Recurrent neurofibromatosis right thigh       Post-Op Diagnosis Codes:  Same    Procedure/CPT® Codes:      Procedure(s):  EXCISION LARGE NEUROFIBROMA RIGHT THIGH    Surgeon(s):  Arnel Chopra MD    Anesthesia: General    Staff:   Circulator: Barbara Mckeon RN  Scrub Person: Cleveland Catherine    Estimated Blood Loss: minimal    Urine Voided: * No values recorded between 10/9/2019  8:40 AM and 10/9/2019 10:16 AM *    Specimens:                Specimens     ID Source Type Tests Collected By Collected At Frozen?      A Thigh, Right Tissue · TISSUE PATHOLOGY EXAM   Arnel Chopra MD 10/9/19 0928 No     Description: recurrent neurofibroma right medial thigh                Drains:   Closed/Suction Drain 1 Right;Medial Thigh Bulb (Active)       Findings: Extensive subcutaneous neurofibromatosis right medial and anterior thigh    Complications: None      Arnel Chopra MD     Date: 10/9/2019  Time: 10:16 AM

## 2019-10-09 NOTE — ANESTHESIA PREPROCEDURE EVALUATION
Anesthesia Evaluation     no history of anesthetic complications:               Airway   Mallampati: I  Neck ROM: full  no difficulty expected  Dental - normal exam     Pulmonary - negative pulmonary ROS and normal exam   (-) COPD, asthma, sleep apnea, not a smoker    PE comment: nonlabored  Cardiovascular - normal exam    Rhythm: regular  Rate: normal    (+) hypertension well controlled,   (-) valvular problems/murmurs, past MI, CAD, dysrhythmias, angina      Neuro/Psych  (+) headaches (migraines),     (-) seizures, TIA, CVA  GI/Hepatic/Renal/Endo - negative ROS   (-) GERD, liver disease, no renal disease, diabetes, hypothyroidism, hyperthyroidism    Musculoskeletal         ROS comment: Lumbar disc herniation-->now s/p discectomy  Abdominal    Substance History      OB/GYN          Other   (+) arthritis       Other Comment: Neurofibroma R thigh;  Neurofibromatosis                  Anesthesia Plan    ASA 2     general     intravenous induction   Anesthetic plan, all risks, benefits, and alternatives have been provided, discussed and informed consent has been obtained with: patient.

## 2019-10-09 NOTE — PLAN OF CARE
Problem: Patient Care Overview  Goal: Plan of Care Review  Outcome: Ongoing (interventions implemented as appropriate)   10/09/19 7968   Coping/Psychosocial   Plan of Care Reviewed With patient   Plan of Care Review   Progress improving   OTHER   Outcome Summary pt post surgery on back of R thigh, drsg CDI, JANES x1, amb to BR, voiding, morphine for pain, VSS     Goal: Individualization and Mutuality  Outcome: Ongoing (interventions implemented as appropriate)    Goal: Discharge Needs Assessment  Outcome: Ongoing (interventions implemented as appropriate)      Problem: Surgery Nonspecified (Adult)  Goal: Signs and Symptoms of Listed Potential Problems Will be Absent, Minimized or Managed (Surgery Nonspecified)  Outcome: Ongoing (interventions implemented as appropriate)    Goal: Anesthesia/Sedation Recovery  Outcome: Outcome(s) achieved Date Met: 10/09/19

## 2019-10-09 NOTE — ANESTHESIA PROCEDURE NOTES
Airway  Urgency: elective    Date/Time: 10/9/2019 8:47 AM  Airway not difficult    General Information and Staff    Anesthesiologist: Conner Ortega MD  CRNA: Salina Harrison CRNA    Indications and Patient Condition  Indications for airway management: airway protection    Preoxygenated: yes  Mask difficulty assessment: 0 - not attempted    Final Airway Details  Final airway type: supraglottic airway      Successful airway: unique  Size 4    Number of attempts at approach: 1  Assessment: lips, teeth, and gum same as pre-op and atraumatic intubation

## 2019-10-09 NOTE — ANESTHESIA POSTPROCEDURE EVALUATION
Patient: Aislinn Marie    Procedure Summary     Date:  10/09/19 Room / Location:   [facility]    Anesthesia Start:  0840 Anesthesia Stop:  1017    Procedure:  EXCISION LARGE NEUROFIBROMA RIGHT THIGH (Right ) Diagnosis:  (Recurrent neurofibromatosis right thigh)    Surgeon:  Arnel Chopra MD Provider:  Wade Zhao MD    Anesthesia Type:  general ASA Status:  2          Anesthesia Type: general  Last vitals  BP   114/84 (10/09/19 1100)   Temp   36.5 °C (97.7 °F) (10/09/19 1020)   Pulse   64 (10/09/19 1100)   Resp   16 (10/09/19 1100)     SpO2   100 % (10/09/19 1100)     Post Anesthesia Care and Evaluation    Patient location during evaluation: bedside  Patient participation: complete - patient participated  Level of consciousness: awake  Pain management: adequate  Airway patency: patent  Anesthetic complications: No anesthetic complications    Cardiovascular status: acceptable  Respiratory status: acceptable  Hydration status: acceptable    Comments: */84 (BP Location: Left arm, Patient Position: Lying)   Pulse 64   Temp 36.5 °C (97.7 °F) (Oral)   Resp 16   LMP 09/28/2019   SpO2 100%

## 2019-10-10 VITALS
OXYGEN SATURATION: 100 % | RESPIRATION RATE: 18 BRPM | DIASTOLIC BLOOD PRESSURE: 78 MMHG | BODY MASS INDEX: 27.84 KG/M2 | TEMPERATURE: 97 F | WEIGHT: 167.11 LBS | HEART RATE: 76 BPM | SYSTOLIC BLOOD PRESSURE: 115 MMHG | HEIGHT: 65 IN

## 2019-10-10 PROCEDURE — 25010000002 MORPHINE PER 10 MG: Performed by: PLASTIC SURGERY

## 2019-10-10 RX ORDER — HYDROCODONE BITARTRATE AND ACETAMINOPHEN 7.5; 325 MG/1; MG/1
1 TABLET ORAL EVERY 6 HOURS PRN
Status: DISCONTINUED | OUTPATIENT
Start: 2019-10-10 | End: 2019-10-10 | Stop reason: HOSPADM

## 2019-10-10 RX ADMIN — MORPHINE SULFATE 6 MG: 2 INJECTION, SOLUTION INTRAMUSCULAR; INTRAVENOUS at 11:46

## 2019-10-10 RX ADMIN — HYDROCODONE BITARTRATE AND ACETAMINOPHEN 1 TABLET: 7.5; 325 TABLET ORAL at 17:52

## 2019-10-10 RX ADMIN — HYDROCODONE BITARTRATE AND ACETAMINOPHEN 1 TABLET: 7.5; 325 TABLET ORAL at 14:06

## 2019-10-10 RX ADMIN — IBUPROFEN 400 MG: 400 TABLET, FILM COATED ORAL at 08:23

## 2019-10-10 RX ADMIN — DOCUSATE SODIUM 100 MG: 100 CAPSULE, LIQUID FILLED ORAL at 08:23

## 2019-10-10 NOTE — PLAN OF CARE
Problem: Patient Care Overview  Goal: Plan of Care Review  Outcome: Ongoing (interventions implemented as appropriate)   10/10/19 0418   Coping/Psychosocial   Plan of Care Reviewed With patient   Plan of Care Review   Progress improving   OTHER   Outcome Summary IV pain meds given. VSS. VOiding freely. Tolerating reg diet. JANES x1. Scant amount of drainage on dressing. Will cont to monitor.      Goal: Individualization and Mutuality  Outcome: Ongoing (interventions implemented as appropriate)    Goal: Discharge Needs Assessment  Outcome: Ongoing (interventions implemented as appropriate)    Goal: Interprofessional Rounds/Family Conf  Outcome: Ongoing (interventions implemented as appropriate)      Problem: Surgery Nonspecified (Adult)  Goal: Signs and Symptoms of Listed Potential Problems Will be Absent, Minimized or Managed (Surgery Nonspecified)  Outcome: Ongoing (interventions implemented as appropriate)

## 2019-10-11 LAB
LAB AP CASE REPORT: NORMAL
LAB AP DIAGNOSIS COMMENT: NORMAL
PATH REPORT.FINAL DX SPEC: NORMAL
PATH REPORT.GROSS SPEC: NORMAL

## 2019-10-11 NOTE — PROGRESS NOTES
Discharge Planning Assessment  [facility]     Patient Name: Aislinn Marie  MRN: [id number]  Today's Date: 10/11/2019    Admit Date: 10/9/2019      Discharge Plan     Row Name 10/11/19 1614       Plan    Plan Comments  Discharged, no discharge needs identified.     Final Discharge Disposition Code  01 - home or self-care     Expected Discharge Date and Time     Expected Discharge Date Expected Discharge Time    Oct 10, 2019      Estrella Fajardo RN

## 2019-10-21 NOTE — DISCHARGE SUMMARY
Patient underwent excision of a large recurrent neurofibroma on the right thigh and was admitted for pain control and observation as a planned event.  Uneventful recovery and discharged home the following day.

## 2020-06-01 ENCOUNTER — TRANSCRIBE ORDERS (OUTPATIENT)
Dept: ADMINISTRATIVE | Facility: HOSPITAL | Age: 40
End: 2020-06-01

## 2020-06-01 DIAGNOSIS — Z12.31 VISIT FOR SCREENING MAMMOGRAM: Primary | ICD-10-CM

## 2020-06-29 ENCOUNTER — HOSPITAL ENCOUNTER (OUTPATIENT)
Dept: MAMMOGRAPHY | Facility: HOSPITAL | Age: 40
Discharge: HOME | End: 2020-06-29
Admitting: OBSTETRICS & GYNECOLOGY

## 2020-06-29 DIAGNOSIS — Z12.31 VISIT FOR SCREENING MAMMOGRAM: ICD-10-CM

## 2020-06-29 PROCEDURE — 77063 BREAST TOMOSYNTHESIS BI: CPT

## 2020-06-29 PROCEDURE — 77067 SCR MAMMO BI INCL CAD: CPT

## 2020-07-20 ENCOUNTER — OFFICE VISIT (OUTPATIENT)
Dept: OBSTETRICS AND GYNECOLOGY | Facility: CLINIC | Age: 40
End: 2020-07-20

## 2020-07-20 VITALS
HEIGHT: 65 IN | SYSTOLIC BLOOD PRESSURE: 118 MMHG | WEIGHT: 168.6 LBS | BODY MASS INDEX: 28.09 KG/M2 | DIASTOLIC BLOOD PRESSURE: 72 MMHG

## 2020-07-20 DIAGNOSIS — Z00.00 ANNUAL PHYSICAL EXAM: Primary | ICD-10-CM

## 2020-07-20 DIAGNOSIS — N95.1 MENOPAUSAL SYMPTOMS: ICD-10-CM

## 2020-07-20 PROCEDURE — 99396 PREV VISIT EST AGE 40-64: CPT | Performed by: OBSTETRICS & GYNECOLOGY

## 2020-07-20 PROCEDURE — 99213 OFFICE O/P EST LOW 20 MIN: CPT | Performed by: OBSTETRICS & GYNECOLOGY

## 2020-07-20 NOTE — PROGRESS NOTES
HPI   Aislinn Marie  is a 40 y.o. female who presents for 2 reasons.  First, she is due for a routine gynecologic exam.  Overall, she is feeling well.  She reports a recent normal mammogram.  Bowels and bladder are functioning normally.  Next, she has begun to experience some irregularity in her cycle.  This is been accompanied by mood swings.  Also, there is been a single hot flash.  The patient has noticed a decrease in libido as well.    Chief Complaint   Patient presents with   • Gynecologic Exam     Patient is here for a annual and decreased sex drive.       Past Medical History:   Diagnosis Date   • Arthritis    • Essential hypertension 4/7/2016   • Hemorrhoid    • Lumbar disc herniation    • Neurofibroma of lower extremity     RIGHT THIGH   • Ocular migraine    • Varicose veins        Past Surgical History:   Procedure Laterality Date   • CHOLECYSTECTOMY      Dr. Vasquez   • COLONOSCOPY W/ POLYPECTOMY N/A 10/24/2018    Procedure: COLONOSCOPY TO CECUM AND TERMINAL ILEUM WITH COLD POLYPECTOMY;  Surgeon: Lawson Krueger MD;  Location: [facility] ENDOSCOPY;  Service: Gastroenterology   • ENDOSCOPY N/A 10/24/2018    Procedure: ESOPHAGOGASTRODUODENOSCOPY WITH COLD BIOPSIES;  Surgeon: aLwson Krueger MD;  Location: [facility] ENDOSCOPY;  Service: Gastroenterology   • ENDOVENOUS ABLATION SAPHENOUS VEIN W/ LASER      Dr. Cárdenas   • ESSURE TUBAL LIGATION  2013    Dr. CANELO Saini   • EXCISION MASS LEG Right 10/9/2019    Procedure: EXCISION LARGE NEUROFIBROMA RIGHT THIGH;  Surgeon: Arnel Chopra MD;  Location: [facility] OR OSC;  Service: Plastics   • LUMBAR DISCECTOMY FUSION INSTRUMENTATION Left 6/11/2016    Procedure: LUMBAR DISCECTOMY POSTERIOR WITH METRIX, Left L4/5, LEFT DECOMPRESSION L5/S1;  Surgeon: James Washburn MD;  Location: [facility] MAIN OR;  Service:    • MICROAMBULATORY PHLEBECTOMY      Dr. Cárdenas   • NEUROFIBROMA EXCISION     • WISDOM TOOTH EXTRACTION         Social History     Socioeconomic  History   • Marital status:      Spouse name: Not on file   • Number of children: Not on file   • Years of education: Not on file   • Highest education level: Not on file   Tobacco Use   • Smoking status: Never Smoker   • Smokeless tobacco: Never Used   Substance and Sexual Activity   • Alcohol use: Yes     Comment: wine on a rare occasion   • Drug use: No   • Sexual activity: Defer     Comment: essure       The following portions of the patient's history were reviewed and updated as appropriate: allergies, current medications, past family history, past medical history, past social history, past surgical history and problem list.    Review of Systems  This is positive for mood swings and a hot flash.  There is some cycle irregularity.  Decreased libido.  This is negative for helplessness and hopelessness.  Negative for suicidal or homicidal ideation.  Negative for unexplained weight change.  Negative for fever or chills.  Negative for nausea or vomiting.  Negative for abnormal bleeding.  All other systems are reviewed and are negative        Physical Exam   Constitutional: She is oriented to person, place, and time. She appears well-developed and well-nourished.   HENT:   Head: Normocephalic and atraumatic.   Cardiovascular: Normal rate and regular rhythm.   Pulmonary/Chest: Effort normal and breath sounds normal. She has no wheezes. She has no rales.   The breasts are homogeneous.  There are no palpable lumps.  Nipple discharge and axillary adenopathy are absent.   Abdominal: Soft. She exhibits no distension. There is no tenderness.   Genitourinary: Vagina normal and uterus normal. There is no lesion on the right labia. There is no lesion on the left labia. Cervix exhibits no motion tenderness. Right adnexum displays no mass and no tenderness. Left adnexum displays no mass and no tenderness. No vaginal discharge found.   Neurological: She is alert and oriented to person, place, and time.   Skin: Skin is warm  and dry.   Nursing note and vitals reviewed.      Assessment    Aislinn was seen today for gynecologic exam.    Diagnoses and all orders for this visit:    Annual physical exam    Menopausal symptoms  -     Follicle Stimulating Hormone        Plan  1. Annual examination and Pap smear performed  2. Counseled regarding a [facility] recommendations for mammography every 1 to 2 years between the ages of 40 and 50.  Mammogram was performed earlier this month.  It was negative.  3. Counseled regarding menopausal symptoms.  The patient does have mood swings and slight irregularity of her cycle.  She has decreased libido and a single hot flash.  We discussed the pathophysiology of the perimenopause.  I answered the patient's questions.  We discussed possible work-up and possible treatment.  15 minutes of a 25-minute visit were spent in direct face-to-face counseling on this issue.  The patient would like to proceed with FSH testing.  She would like to avoid hormonal treatment if at all possible.  She will consider counseling as well.    4. No follow-ups on file.    Social History     Tobacco Use   Smoking Status Never Smoker   5.

## 2020-07-21 LAB — FSH SERPL-ACNC: 2.9 MIU/ML

## 2020-07-22 LAB
CONV .: NORMAL
CYTOLOGIST CVX/VAG CYTO: NORMAL
CYTOLOGY CVX/VAG DOC CYTO: NORMAL
CYTOLOGY CVX/VAG DOC THIN PREP: NORMAL
DX ICD CODE: NORMAL
HIV 1 & 2 AB SER-IMP: NORMAL
OTHER STN SPEC: NORMAL
STAT OF ADQ CVX/VAG CYTO-IMP: NORMAL

## 2020-09-03 NOTE — ANESTHESIA POSTPROCEDURE EVALUATION
Patient: Aislinn Marie    Procedure Summary     Date:  10/24/18 Room / Location:  [facility] ENDOSCOPY 10 / [facility] ENDOSCOPY    Anesthesia Start:  1515 Anesthesia Stop:  1548    Procedures:       ESOPHAGOGASTRODUODENOSCOPY WITH COLD BIOPSIES (N/A Esophagus)      COLONOSCOPY TO CECUM AND TERMINAL ILEUM WITH COLD POLYPECTOMY (N/A ) Diagnosis:       Lower abdominal pain      Upper abdominal pain      (Lower abdominal pain [R10.30])      (Upper abdominal pain [R10.10])    Surgeon:  Lawson Krueger MD Provider:  Wilfrido Rodriguez MD    Anesthesia Type:  MAC ASA Status:  2          Anesthesia Type: MAC  Last vitals  BP   111/67 (10/24/18 1547)   Temp   36.9 °C (98.4 °F) (10/24/18 1403)   Pulse   77 (10/24/18 1547)   Resp   16 (10/24/18 1547)     SpO2   100 % (10/24/18 1547)     Post Anesthesia Care and Evaluation    Patient location during evaluation: PHASE II  Patient participation: complete - patient participated  Level of consciousness: awake and alert  Pain management: adequate  Airway patency: patent  Anesthetic complications: No anesthetic complications  PONV Status: none  Cardiovascular status: acceptable  Respiratory status: acceptable  Hydration status: acceptable       [Nasal Endoscopy Performed] : nasal endoscopy was performed, see procedure section for findings [Midline] : trachea located in midline position [Normal] : no rashes

## 2021-04-06 ENCOUNTER — BULK ORDERING (OUTPATIENT)
Dept: CASE MANAGEMENT | Facility: OTHER | Age: 41
End: 2021-04-06

## 2021-04-06 DIAGNOSIS — Z23 IMMUNIZATION DUE: ICD-10-CM

## 2021-09-02 ENCOUNTER — TELEMEDICINE (OUTPATIENT)
Dept: FAMILY MEDICINE CLINIC | Facility: TELEHEALTH | Age: 41
End: 2021-09-02

## 2021-09-02 DIAGNOSIS — R39.89 SUSPECTED UTI: Primary | ICD-10-CM

## 2021-09-02 PROCEDURE — 99212 OFFICE O/P EST SF 10 MIN: CPT | Performed by: NURSE PRACTITIONER

## 2021-09-02 RX ORDER — SULFAMETHOXAZOLE AND TRIMETHOPRIM 800; 160 MG/1; MG/1
1 TABLET ORAL 2 TIMES DAILY
Qty: 6 TABLET | Refills: 0 | Status: SHIPPED | OUTPATIENT
Start: 2021-09-02 | End: 2021-09-06

## 2021-09-02 NOTE — PROGRESS NOTES
Subjective   Aislinn Marie is a 41 y.o. female.     Her symptoms started this morning with pain with urination. She has frequent urination. She has not been on antibiotics recently. She gets 3-4 UTIs a year.       The following portions of the patient's history were reviewed and updated as appropriate: allergies, current medications, past family history, past medical history, past social history, past surgical history and problem list.    Review of Systems   Constitutional: Negative for fever.   Gastrointestinal: Negative for abdominal distention, abdominal pain, nausea and vomiting.   Genitourinary: Positive for dysuria, frequency and urgency. Negative for hematuria, vaginal discharge and vaginal pain.   Musculoskeletal: Negative for back pain.       Objective   Physical Exam  Constitutional:       General: She is not in acute distress.     Appearance: She is well-developed. She is not diaphoretic.   Pulmonary:      Effort: Pulmonary effort is normal.   Neurological:      Mental Status: She is alert and oriented to person, place, and time.   Psychiatric:         Behavior: Behavior normal.           Assessment/Plan   Diagnoses and all orders for this visit:    1. Suspected UTI (Primary)  -     sulfamethoxazole-trimethoprim (Bactrim DS) 800-160 MG per tablet; Take 1 tablet by mouth 2 (Two) Times a Day for 3 days.  Dispense: 6 tablet; Refill: 0               The use of a video visit has been reviewed with the patient and verbal informed consent has been obtained. Myself and Aislinn Marie participated in this visit. The patient is located in [place], [place]. I am located in [place], [place]. Mychart and Zoom were utilized. I spent 10 minutes in the patient's chart for this visit.

## 2021-09-02 NOTE — PATIENT INSTRUCTIONS
Take antibiotic until gone  Increase fluid intake  Avoid caffeine and carbonation  If you develop fever or mid-back pain, go to ER  If symptoms worsen or do not improve in 48 hours, go to urgent care         Urinary Tract Infection, Adult  A urinary tract infection (UTI) is an infection of any part of the urinary tract. The urinary tract includes:  · The kidneys.  · The ureters.  · The bladder.  · The urethra.  These organs make, store, and get rid of pee (urine) in the body.  What are the causes?  This is caused by germs (bacteria) in your genital area. These germs grow and cause swelling (inflammation) of your urinary tract.  What increases the risk?  You are more likely to develop this condition if:  · You have a small, thin tube (catheter) to drain pee.  · You cannot control when you pee or poop (incontinence).  · You are female, and:  ? You use these methods to prevent pregnancy:  § A medicine that kills sperm (spermicide).  § A device that blocks sperm (diaphragm).  ? You have low levels of a female hormone (estrogen).  ? You are pregnant.  · You have genes that add to your risk.  · You are sexually active.  · You take antibiotic medicines.  · You have trouble peeing because of:  ? A prostate that is bigger than normal, if you are male.  ? A blockage in the part of your body that drains pee from the bladder (urethra).  ? A kidney stone.  ? A nerve condition that affects your bladder (neurogenic bladder).  ? Not getting enough to drink.  ? Not peeing often enough.  · You have other conditions, such as:  ? Diabetes.  ? A weak disease-fighting system (immune system).  ? Sickle cell disease.  ? Gout.  ? Injury of the spine.  What are the signs or symptoms?  Symptoms of this condition include:  · Needing to pee right away (urgently).  · Peeing often.  · Peeing small amounts often.  · Pain or burning when peeing.  · Blood in the pee.  · Pee that smells bad or not like normal.  · Trouble peeing.  · Pee that is  cloudy.  · Fluid coming from the vagina, if you are female.  · Pain in the belly or lower back.  Other symptoms include:  · Throwing up (vomiting).  · No urge to eat.  · Feeling mixed up (confused).  · Being tired and grouchy (irritable).  · A fever.  · Watery poop (diarrhea).  How is this treated?  This condition may be treated with:  · Antibiotic medicine.  · Other medicines.  · Drinking enough water.  Follow these instructions at home:    Medicines  · Take over-the-counter and prescription medicines only as told by your doctor.  · If you were prescribed an antibiotic medicine, take it as told by your doctor. Do not stop taking it even if you start to feel better.  General instructions  · Make sure you:  ? Pee until your bladder is empty.  ? Do not hold pee for a long time.  ? Empty your bladder after sex.  ? Wipe from front to back after pooping if you are a female. Use each tissue one time when you wipe.  · Drink enough fluid to keep your pee pale yellow.  · Keep all follow-up visits as told by your doctor. This is important.  Contact a doctor if:  · You do not get better after 1-2 days.  · Your symptoms go away and then come back.  Get help right away if:  · You have very bad back pain.  · You have very bad pain in your lower belly.  · You have a fever.  · You are sick to your stomach (nauseous).  · You are throwing up.  Summary  · A urinary tract infection (UTI) is an infection of any part of the urinary tract.  · This condition is caused by germs in your genital area.  · There are many risk factors for a UTI. These include having a small, thin tube to drain pee and not being able to control when you pee or poop.  · Treatment includes antibiotic medicines for germs.  · Drink enough fluid to keep your pee pale yellow.  This information is not intended to replace advice given to you by your health care provider. Make sure you discuss any questions you have with your health care provider.  Document Revised:  12/05/2019 Document Reviewed: 06/27/2019  [employer] Patient Education © 2021 [employer]  Sulfamethoxazole; Trimethoprim, SMX-TMP tablets  What is this medicine?  SULFAMETHOXAZOLE; TRIMETHOPRIM or SMX-TMP (suhl fuh meth OK chin zohl; trye METH oh prim) is a combination of a sulfonamide antibiotic and a second antibiotic, trimethoprim. It is used to treat or prevent certain kinds of bacterial infections. It will not work for colds, flu, or other viral infections.  This medicine may be used for other purposes; ask your health care provider or pharmacist if you have questions.  COMMON BRAND NAME(S): Bacter-Aid DS, Bactrim, Bactrim DS, Septra, Septra DS  What should I tell my health care provider before I take this medicine?  They need to know if you have any of these conditions:  · G6PD deficiency  · HIV or AIDS  · kidney disease  · liver disease  · low platelets  · low red blood cell counts  · poor nutrition  · stomach or intestine problems like colitis  · thyroid disease  · an unusual or allergic reaction to sulfamethoxazole, trimethoprim, sulfa drugs, other drugs, foods, dyes, or preservatives  · pregnant or trying to get pregnant  · breast-feeding  How should I use this medicine?  Take this medicine by mouth with a glass of water. Follow the directions on the prescription label. Take your medicine at regular intervals. Do not take it more often than directed. Take all of your medicine as directed even if you think you are better. Do not skip doses or stop your medicine early.  Talk to your pediatrician regarding the use of this medicine in children. While this drug may be prescribed for children as young as 2 months for selected conditions, precautions do apply.  Overdosage: If you think you have taken too much of this medicine contact a poison control center or emergency room at once.  NOTE: This medicine is only for you. Do not share this medicine with others.  What if I miss a dose?  If you miss a dose, take it  as soon as you can. If it is almost time for your next dose, take only that dose. Do not take double or extra doses.  What may interact with this medicine?  Do not take this medicine with any of the following medications:  · dofetilide  This medicine may also interact with the following medications:  · amantadine  · birth control pills  · certain medicines for blood pressure, heart disease  · certain medicines for depression, like amitriptyline  · certain medicines for diabetes, like glipizide or glyburide  · certain medicines that treat or prevent blood clots like warfarin  · cyclosporine  · digoxin  · diuretics  · indomethacin  · methotrexate  · phenytoin  · procainamide  · pyrimethamine  · zidovudine  This list may not describe all possible interactions. Give your health care provider a list of all the medicines, herbs, non-prescription drugs, or dietary supplements you use. Also tell them if you smoke, drink alcohol, or use illegal drugs. Some items may interact with your medicine.  What should I watch for while using this medicine?  Tell your health care provider if your symptoms do not start to get better or if they get worse.  Do not treat diarrhea with over the counter products. Contact your health care provider if you have diarrhea that lasts more than 2 days or if it is severe and watery.  This drug may cause serious skin reactions. They can happen weeks to months after starting the drug. Contact your health care provider right away if you notice fevers or flu-like symptoms with a rash. The rash may be red or purple and then turn into blisters or peeling of the skin. Or, you might notice a red rash with swelling of the face, lips or lymph nodes in your neck or under your arms.  This drug can make you more sensitive to the sun. Keep out of the sun. If you cannot avoid being in the sun, wear protective clothing and sunscreen. Do not use sun lamps or tanning beds/booths.  Be careful brushing or flossing your  teeth or using a toothpick because you may get an infection or bleed more easily. If you have any dental work done, tell your dentist you are receiving this drug.  What side effects may I notice from receiving this medicine?  Side effects that you should report to your doctor or health care professional as soon as possible:  · allergic reactions (skin rash, itching or hives; swelling of the face, lips, or tongue)  · bloody or watery diarrhea  · cough  · heartbeat rhythm changes (trouble breathing; chest pain; dizziness; fast, irregular heartbeat; feeling faint or lightheaded, falls,)  · fever  · high potassium levels (chest pain; fast, irregular heartbeat; muscle weakness)  · kidney injury (trouble passing urine or change in the amount of urine)  · liver injury (dark yellow or brown urine; general ill feeling or flu-like symptoms; loss of appetite, right upper belly pain; unusually weak or tired, yellowing of the eyes or skin)  · low blood pressure (dizziness; feeling faint or lightheaded, falls; unusually weak or tired)  · low blood sugar (feeling anxious; confusion; dizziness; increased hunger; unusually weak or tired; increased sweating; shakiness; cold, clammy skin; irritable; headache; blurred vision; fast heartbeat; loss of consciousness)  · low red blood cell counts (trouble breathing; feeling faint; lightheaded, falls; unusually weak or tired)  · rash, fever, and swollen lymph nodes  · redness, blistering, peeling, or loosening of the skin, including inside the mouth  · trouble breathing  · unusual bruising or bleeding  Side effects that usually do not require medical attention (report to your doctor or health care professional if they continue or are bothersome):  · lack or loss of appetite  · nausea  · vomiting  This list may not describe all possible side effects. Call your doctor for medical advice about side effects. You may report side effects to FDA at 2-441-KZG-0131.  Where should I keep my  medicine?  Keep out of the reach of children.  Store between 15 and 25 degrees C (59 to 77 degrees F). Protect from light. Keep the container tightly closed. Throw away any unused drug after the expiration date.  NOTE: This sheet is a summary. It may not cover all possible information. If you have questions about this medicine, talk to your doctor, pharmacist, or health care provider.  © 2021 [employer]vier/[employer] Standard (2020-11-20 09:54:22)

## 2021-09-29 DIAGNOSIS — M51.36 DDD (DEGENERATIVE DISC DISEASE), LUMBAR: Primary | ICD-10-CM

## 2021-09-29 DIAGNOSIS — M51.36 DDD (DEGENERATIVE DISC DISEASE), LUMBAR: ICD-10-CM

## 2021-09-29 RX ORDER — GABAPENTIN 100 MG/1
CAPSULE ORAL
Qty: 30 CAPSULE | Refills: 3 | Status: SHIPPED | OUTPATIENT
Start: 2021-09-29 | End: 2021-09-29 | Stop reason: SDUPTHER

## 2021-09-29 RX ORDER — GABAPENTIN 100 MG/1
CAPSULE ORAL
Qty: 30 CAPSULE | Refills: 3 | Status: SHIPPED | OUTPATIENT
Start: 2021-09-29 | End: 2022-07-28

## 2021-09-29 NOTE — PROGRESS NOTES
Its been 5 years since she has had a microdiscectomy.  She has been having recurrent leg pain.  No motor deficits.  We will try some gabapentin at night to the 100 mg.  That is what bothers her the most.  We will see if that is helpful.  If not she might need to be reimaged.

## 2022-05-03 ENCOUNTER — TELEMEDICINE (OUTPATIENT)
Dept: FAMILY MEDICINE CLINIC | Facility: TELEHEALTH | Age: 42
End: 2022-05-03

## 2022-05-03 DIAGNOSIS — R39.89 SUSPECTED UTI: Primary | ICD-10-CM

## 2022-05-03 PROCEDURE — BHEMPVIDEOVISIT: Performed by: NURSE PRACTITIONER

## 2022-05-03 RX ORDER — NITROFURANTOIN 25; 75 MG/1; MG/1
100 CAPSULE ORAL 2 TIMES DAILY
Qty: 10 CAPSULE | Refills: 0 | Status: SHIPPED | OUTPATIENT
Start: 2022-05-03 | End: 2022-05-08

## 2022-05-03 NOTE — PROGRESS NOTES
CHIEF COMPLAINT  Chief Complaint   Patient presents with   • Urinary Tract Infection         HPI  Aislinn Marie is a 42 y.o. female  presents with complaint of symptoms of UTI for one day.     Review of Systems   Constitutional: Negative for chills, diaphoresis and fever.   Gastrointestinal: Negative for nausea and vomiting.   Genitourinary: Positive for dysuria, frequency and urgency. Negative for flank pain, genital sores, hematuria, pelvic pain and vaginal discharge.       Past Medical History:   Diagnosis Date   • Arthritis    • Essential hypertension 4/7/2016   • Hemorrhoid    • Lumbar disc herniation    • Neurofibroma of lower extremity     RIGHT THIGH   • Ocular migraine    • Varicose veins        Family History   Problem Relation Age of Onset   • Hypertension Mother    • Diabetes type II Mother    • Hypertension Maternal Grandmother    • Colon cancer Maternal Grandmother    • Stroke Maternal Grandfather    • Breast cancer Maternal Great-Grandmother    • Malig Hyperthermia Neg Hx        Social History     Socioeconomic History   • Marital status:    Tobacco Use   • Smoking status: Never Smoker   • Smokeless tobacco: Never Used   Vaping Use   • Vaping Use: Never used   Substance and Sexual Activity   • Alcohol use: Yes     Comment: wine on a rare occasion   • Drug use: No   • Sexual activity: Defer     Comment: essure       Aislinn Marie  reports that she has never smoked. She has never used smokeless tobacco.. I have educated her on the risk of diseases from using tobacco products. No tobacco use.               There were no vitals taken for this visit.    PHYSICAL EXAM  Physical Exam   Constitutional: She is oriented to person, place, and time. She appears well-developed and well-nourished. She does not have a sickly appearance. She does not appear ill. No distress.   HENT:   Head: Normocephalic and atraumatic.   Neck: Neck normal appearance.  Pulmonary/Chest: Effort normal.  No respiratory  distress.  Neurological: She is alert and oriented to person, place, and time.   Skin: Skin is dry.   Psychiatric: She has a normal mood and affect.         Diagnoses and all orders for this visit:    1. Suspected UTI (Primary)    Other orders  -     nitrofurantoin, macrocrystal-monohydrate, (Macrobid) 100 MG capsule; Take 1 capsule by mouth 2 (Two) Times a Day for 5 days.  Dispense: 10 capsule; Refill: 0        The use of a video visit has been reviewed with the patient and verbal informd consent has een obtained. Myself and Aislinn Marie participated in this visit. The patient is located in [address] [place] [address]. I am located in [place], [place]. Mychart and Zoom were utilized. I spent 8 minutes in the patient's chart for this visit.           Marcelle Beaver, ETHEL  05/03/2022  07:16 EDT

## 2022-05-03 NOTE — PATIENT INSTRUCTIONS
Drink plenty of water and avoid caffeine  If symptoms do not improve in 3-5 days follow up with your primary care provider or urgent care     Urinary Tract Infection, Adult  A urinary tract infection (UTI) is an infection of any part of the urinary tract. The urinary tract includes:  The kidneys.  The ureters.  The bladder.  The urethra.  These organs make, store, and get rid of pee (urine) in the body.  What are the causes?  This infection is caused by germs (bacteria) in your genital area. These germs grow and cause swelling (inflammation) of your urinary tract.  What increases the risk?  The following factors may make you more likely to develop this condition:  Using a small, thin tube (catheter) to drain pee.  Not being able to control when you pee or poop (incontinence).  Being female. If you are female, these things can increase the risk:  Using these methods to prevent pregnancy:  A medicine that kills sperm (spermicide).  A device that blocks sperm (diaphragm).  Having low levels of a female hormone (estrogen).  Being pregnant.  You are more likely to develop this condition if:  You have genes that add to your risk.  You are sexually active.  You take antibiotic medicines.  You have trouble peeing because of:  A prostate that is bigger than normal, if you are male.  A blockage in the part of your body that drains pee from the bladder.  A kidney stone.  A nerve condition that affects your bladder.  Not getting enough to drink.  Not peeing often enough.  You have other conditions, such as:  Diabetes.  A weak disease-fighting system (immune system).  Sickle cell disease.  Gout.  Injury of the spine.  What are the signs or symptoms?  Symptoms of this condition include:  Needing to pee right away.  Peeing small amounts often.  Pain or burning when peeing.  Blood in the pee.  Pee that smells bad or not like normal.  Trouble peeing.  Pee that is cloudy.  Fluid coming from the vagina, if you are female.  Pain in the  belly or lower back.  Other symptoms include:  Vomiting.  Not feeling hungry.  Feeling mixed up (confused). This may be the first symptom in older adults.  Being tired and grouchy (irritable).  A fever.  Watery poop (diarrhea).  How is this treated?  Taking antibiotic medicine.  Taking other medicines.  Drinking enough water.  In some cases, you may need to see a specialist.  Follow these instructions at home:    Medicines  Take over-the-counter and prescription medicines only as told by your doctor.  If you were prescribed an antibiotic medicine, take it as told by your doctor. Do not stop taking it even if you start to feel better.  General instructions  Make sure you:  Pee until your bladder is empty.  Do not hold pee for a long time.  Empty your bladder after sex.  Wipe from front to back after peeing or pooping if you are a female. Use each tissue one time when you wipe.  Drink enough fluid to keep your pee pale yellow.  Keep all follow-up visits.  Contact a doctor if:  You do not get better after 1-2 days.  Your symptoms go away and then come back.  Get help right away if:  You have very bad back pain.  You have very bad pain in your lower belly.  You have a fever.  You have chills.  You feeling like you will vomit or you vomit.  Summary  A urinary tract infection (UTI) is an infection of any part of the urinary tract.  This condition is caused by germs in your genital area.  There are many risk factors for a UTI.  Treatment includes antibiotic medicines.  Drink enough fluid to keep your pee pale yellow.  This information is not intended to replace advice given to you by your health care provider. Make sure you discuss any questions you have with your health care provider.  Document Revised: 07/30/2021 Document Reviewed: 07/30/2021  [employer] Patient Education © 2021 [employer]

## 2022-05-12 ENCOUNTER — APPOINTMENT (OUTPATIENT)
Dept: GENERAL RADIOLOGY | Facility: HOSPITAL | Age: 42
End: 2022-05-12

## 2022-05-12 ENCOUNTER — HOSPITAL ENCOUNTER (EMERGENCY)
Facility: HOSPITAL | Age: 42
Discharge: HOME | End: 2022-05-12
Attending: EMERGENCY MEDICINE | Admitting: EMERGENCY MEDICINE

## 2022-05-12 VITALS
BODY MASS INDEX: 28.32 KG/M2 | OXYGEN SATURATION: 96 % | SYSTOLIC BLOOD PRESSURE: 121 MMHG | HEIGHT: 65 IN | DIASTOLIC BLOOD PRESSURE: 84 MMHG | WEIGHT: 170 LBS | HEART RATE: 70 BPM | TEMPERATURE: 98.4 F | RESPIRATION RATE: 16 BRPM

## 2022-05-12 DIAGNOSIS — B34.9 ACUTE VIRAL SYNDROME: Primary | ICD-10-CM

## 2022-05-12 LAB
B PARAPERT DNA SPEC QL NAA+PROBE: NOT DETECTED
B PERT DNA SPEC QL NAA+PROBE: NOT DETECTED
B-HCG UR QL: NEGATIVE
BACTERIA UR QL AUTO: NORMAL /HPF
BILIRUB UR QL STRIP: NEGATIVE
C PNEUM DNA NPH QL NAA+NON-PROBE: NOT DETECTED
CLARITY UR: CLEAR
COLOR UR: YELLOW
FLUAV SUBTYP SPEC NAA+PROBE: NOT DETECTED
FLUBV RNA ISLT QL NAA+PROBE: NOT DETECTED
GLUCOSE UR STRIP-MCNC: NEGATIVE MG/DL
HADV DNA SPEC NAA+PROBE: NOT DETECTED
HCOV 229E RNA SPEC QL NAA+PROBE: NOT DETECTED
HCOV HKU1 RNA SPEC QL NAA+PROBE: NOT DETECTED
HCOV NL63 RNA SPEC QL NAA+PROBE: NOT DETECTED
HCOV OC43 RNA SPEC QL NAA+PROBE: NOT DETECTED
HGB UR QL STRIP.AUTO: NEGATIVE
HMPV RNA NPH QL NAA+NON-PROBE: NOT DETECTED
HPIV1 RNA ISLT QL NAA+PROBE: NOT DETECTED
HPIV2 RNA SPEC QL NAA+PROBE: NOT DETECTED
HPIV3 RNA NPH QL NAA+PROBE: NOT DETECTED
HPIV4 P GENE NPH QL NAA+PROBE: NOT DETECTED
HYALINE CASTS UR QL AUTO: NORMAL /LPF
KETONES UR QL STRIP: ABNORMAL
LEUKOCYTE ESTERASE UR QL STRIP.AUTO: NEGATIVE
M PNEUMO IGG SER IA-ACNC: NOT DETECTED
NITRITE UR QL STRIP: NEGATIVE
PH UR STRIP.AUTO: 6.5 [PH] (ref 5–8)
PROT UR QL STRIP: ABNORMAL
RBC # UR STRIP: NORMAL /HPF
REF LAB TEST METHOD: NORMAL
RHINOVIRUS RNA SPEC NAA+PROBE: NOT DETECTED
RSV RNA NPH QL NAA+NON-PROBE: NOT DETECTED
S PYO AG THROAT QL: NEGATIVE
SARS-COV-2 RNA NPH QL NAA+NON-PROBE: NOT DETECTED
SP GR UR STRIP: 1.03 (ref 1–1.03)
SQUAMOUS #/AREA URNS HPF: NORMAL /HPF
UROBILINOGEN UR QL STRIP: ABNORMAL
WBC # UR STRIP: NORMAL /HPF

## 2022-05-12 PROCEDURE — 0202U NFCT DS 22 TRGT SARS-COV-2: CPT | Performed by: NURSE PRACTITIONER

## 2022-05-12 PROCEDURE — 99284 EMERGENCY DEPT VISIT MOD MDM: CPT

## 2022-05-12 PROCEDURE — 87081 CULTURE SCREEN ONLY: CPT | Performed by: NURSE PRACTITIONER

## 2022-05-12 PROCEDURE — 81001 URINALYSIS AUTO W/SCOPE: CPT | Performed by: NURSE PRACTITIONER

## 2022-05-12 PROCEDURE — 71045 X-RAY EXAM CHEST 1 VIEW: CPT

## 2022-05-12 PROCEDURE — 81025 URINE PREGNANCY TEST: CPT | Performed by: NURSE PRACTITIONER

## 2022-05-12 PROCEDURE — 87147 CULTURE TYPE IMMUNOLOGIC: CPT | Performed by: NURSE PRACTITIONER

## 2022-05-12 PROCEDURE — 87880 STREP A ASSAY W/OPTIC: CPT | Performed by: NURSE PRACTITIONER

## 2022-05-12 RX ORDER — ACETAMINOPHEN 500 MG
1000 TABLET ORAL ONCE
Status: COMPLETED | OUTPATIENT
Start: 2022-05-12 | End: 2022-05-12

## 2022-05-12 RX ORDER — ONDANSETRON 4 MG/1
4 TABLET, ORALLY DISINTEGRATING ORAL EVERY 8 HOURS PRN
Qty: 10 TABLET | Refills: 0 | Status: SHIPPED | OUTPATIENT
Start: 2022-05-12

## 2022-05-12 RX ADMIN — ACETAMINOPHEN 1000 MG: 500 TABLET ORAL at 21:20

## 2022-05-12 NOTE — ED TRIAGE NOTES
Pt c/o fever that started when she woke this am. Reports sore throat, chills, nausea, headache. Pt had neg home covid test. Pt took nyquil at 1530    Pt wearing mask on arrival. Staff wearing mask and goggles at time of triage.

## 2022-05-13 LAB — BACTERIA SPEC AEROBE CULT: ABNORMAL

## 2022-05-13 RX ORDER — AMOXICILLIN 500 MG/1
1000 CAPSULE ORAL 2 TIMES DAILY
Qty: 40 CAPSULE | Refills: 0 | Status: SHIPPED | OUTPATIENT
Start: 2022-05-13 | End: 2022-05-24

## 2022-05-13 NOTE — DISCHARGE INSTRUCTIONS
Although you are being discharged from the ED today, I encourage you to return for worsening symptoms. Things can, and do, change such that treatment at home with medication may not be adequate. Specifically I recommend returning for chest pain or discomfort, difficulty breathing, persistent vomiting or difficulty holding down liquids or medications, fever > 102.0 F,  or any other worsening or alarming symptoms.     Rest. Drink plenty of fluids.  Follow up with PCP or provider listed for further evaluation and management.  Follow up with primary care provider for further management and to have blood pressure rechecked.  Take all medications as prescribed.

## 2022-05-13 NOTE — ED PROVIDER NOTES
EMERGENCY DEPARTMENT ENCOUNTER    Room Number:  06/06  Date seen:  5/12/2022  Time seen: 20:55 EDT  PCP: Robinson Urias MD  Historian: Patient    HPI:  Chief complaint: Sore throat, fever  A complete HPI/ROS/PMH/PSH/SH/FH are unobtainable due to: Not applicable  Context:Aislinn Marie is a 42 y.o. female with history of chronic back problems, neurofibromatosis who presents to the ED with c/o fever all day, fatigue, sore throat, mild nausea that has resolved, moderate body aches and headache.  Her symptoms are not made better by [place] and NyQuil and are not made worse by anything.  She reports sleeping most of the day.  Denies ill contacts, chest pain, shortness of breath.  States she took a covid test this am and it was negative. She has been vaccinated against covid 19.     Patient was placed in face mask in first look. Patient was wearing facemask when I entered the room and throughout our encounter. I wore full protective equipment throughout this patient encounter including a N95 face mask, eye shield and gloves. Hand hygiene/washing of hands was performed before donning protective equipment and after removal when leaving the room.    MEDICAL RECORD REVIEW    ALLERGIES  Mastisol [wound dressing adhesive], Levofloxacin, and Oxycodone    PAST MEDICAL HISTORY  Active Ambulatory Problems     Diagnosis Date Noted   • Lumbar radiculopathy 04/07/2016   • Chronic low back pain 04/07/2016   • LBP (low back pain) 04/07/2016   • Pain of left lower extremity 04/07/2016   • HNP (herniated nucleus pulposus), lumbar 04/07/2016   • Lumbar disc herniation with radiculopathy 06/08/2016   • Postoperative visit 06/24/2016   • Lower abdominal pain 10/09/2018   • Upper abdominal pain 10/09/2018   • Neurofibromatosis (HCC) 10/09/2019   • Neurofibroma of thigh 10/09/2019     Resolved Ambulatory Problems     Diagnosis Date Noted   • Essential hypertension 04/07/2016     Past Medical History:   Diagnosis Date   • Arthritis     • Hemorrhoid    • Lumbar disc herniation    • Neurofibroma of lower extremity    • Ocular migraine    • Varicose veins        PAST SURGICAL HISTORY  Past Surgical History:   Procedure Laterality Date   • CHOLECYSTECTOMY      Dr. Vasquez   • COLONOSCOPY W/ POLYPECTOMY N/A 10/24/2018    Procedure: COLONOSCOPY TO CECUM AND TERMINAL ILEUM WITH COLD POLYPECTOMY;  Surgeon: Lawson Krueger MD;  Location: [facility] ENDOSCOPY;  Service: Gastroenterology   • ENDOSCOPY N/A 10/24/2018    Procedure: ESOPHAGOGASTRODUODENOSCOPY WITH COLD BIOPSIES;  Surgeon: Lawson Krueger MD;  Location: [facility] ENDOSCOPY;  Service: Gastroenterology   • ENDOVENOUS ABLATION SAPHENOUS VEIN W/ LASER      Dr. Cárdenas   • ESSURE TUBAL LIGATION  2013    Dr. CANELO Saini   • EXCISION MASS LEG Right 10/9/2019    Procedure: EXCISION LARGE NEUROFIBROMA RIGHT THIGH;  Surgeon: Arnel Chopra MD;  Location: [facility] OR [facility];  Service: Plastics   • LUMBAR DISCECTOMY FUSION INSTRUMENTATION Left 6/11/2016    Procedure: LUMBAR DISCECTOMY POSTERIOR WITH METRIX, Left L4/5, LEFT DECOMPRESSION L5/S1;  Surgeon: James Washburn MD;  Location: [facility] MAIN OR;  Service:    • MICROAMBULATORY PHLEBECTOMY      Dr. Cárdenas   • NEUROFIBROMA EXCISION     • WISDOM TOOTH EXTRACTION         FAMILY HISTORY  Family History   Problem Relation Age of Onset   • Hypertension Mother    • Diabetes type II Mother    • Hypertension Maternal Grandmother    • Colon cancer Maternal Grandmother    • Stroke Maternal Grandfather    • Breast cancer Maternal Great-Grandmother    • Malig Hyperthermia Neg Hx        SOCIAL HISTORY  Social History     Socioeconomic History   • Marital status:    Tobacco Use   • Smoking status: Never Smoker   • Smokeless tobacco: Never Used   Vaping Use   • Vaping Use: Never used   Substance and Sexual Activity   • Alcohol use: Yes     Comment: wine on a rare occasion   • Drug use: No   • Sexual activity: Defer     Comment: essure       REVIEW OF  SYSTEMS  Review of Systems    All systems reviewed and negative except for those discussed in HPI.     PHYSICAL EXAM    ED Triage Vitals [05/12/22 1902]   Temp Heart Rate Resp BP SpO2   (!) 101 °F (38.3 °C) 116 18 139/93 98 %      Temp src Heart Rate Source Patient Position BP Location FiO2 (%)   Tympanic Monitor Standing Right arm --     Physical Exam    I have reviewed the triage vital signs and nursing notes.      GENERAL: not distressed  HENT: nares patent, mucous membranes moist.  No posterior pharynx erythema, edema or exudates.  Bilateral TMs are normal  EYES: no scleral icterus  NECK: no ROM limitations  CV: regular rhythm, regular rate, no murmur, no rubs no gallop  RESPIRATORY: normal effort, clear to auscultate bilaterally  ABDOMEN: soft  : deferred  MUSCULOSKELETAL: no deformity  NEURO: alert, moves all extremities, follows commands  SKIN: warm, dry    LAB RESULTS  Recent Results (from the past 24 hour(s))   Respiratory Panel PCR w/COVID-19(SARS-CoV-2) NESHA/NARESH/ROSMERY/PAD/COR/MAD/JASEN In-House, NP Swab in UTM/VTM, 3-4 HR TAT - Swab, Nasopharynx    Collection Time: 05/12/22  9:19 PM    Specimen: Nasopharynx; Swab   Result Value Ref Range    ADENOVIRUS, PCR Not Detected Not Detected    Coronavirus 229E Not Detected Not Detected    Coronavirus HKU1 Not Detected Not Detected    Coronavirus NL63 Not Detected Not Detected    Coronavirus OC43 Not Detected Not Detected    COVID19 Not Detected Not Detected - Ref. Range    Human Metapneumovirus Not Detected Not Detected    Human Rhinovirus/Enterovirus Not Detected Not Detected    Influenza A PCR Not Detected Not Detected    Influenza B PCR Not Detected Not Detected    Parainfluenza Virus 1 Not Detected Not Detected    Parainfluenza Virus 2 Not Detected Not Detected    Parainfluenza Virus 3 Not Detected Not Detected    Parainfluenza Virus 4 Not Detected Not Detected    RSV, PCR Not Detected Not Detected    Bordetella pertussis pcr Not Detected Not Detected     Bordetella parapertussis PCR Not Detected Not Detected    Chlamydophila pneumoniae PCR Not Detected Not Detected    Mycoplasma pneumo by PCR Not Detected Not Detected   Rapid Strep A Screen - Swab, Throat    Collection Time: 05/12/22  9:19 PM    Specimen: Throat; Swab   Result Value Ref Range    Strep A Ag Negative Negative   Pregnancy, Urine - Urine, Clean Catch    Collection Time: 05/12/22  9:28 PM    Specimen: Urine, Clean Catch   Result Value Ref Range    HCG, Urine QL Negative Negative   Urinalysis With Culture If Indicated - Urine, Clean Catch    Collection Time: 05/12/22  9:28 PM    Specimen: Urine, Clean Catch   Result Value Ref Range    Color, UA Yellow Yellow, Straw    Appearance, UA Clear Clear    pH, UA 6.5 5.0 - 8.0    Specific Gravity, UA 1.028 1.005 - 1.030    Glucose, UA Negative Negative    Ketones, UA 15 mg/dL (1+) (A) Negative    Bilirubin, UA Negative Negative    Blood, UA Negative Negative    Protein, UA 30 mg/dL (1+) (A) Negative    Leuk Esterase, UA Negative Negative    Nitrite, UA Negative Negative    Urobilinogen, UA 1.0 E.U./dL 0.2 - 1.0 E.U./dL   Urinalysis, Microscopic Only - Urine, Clean Catch    Collection Time: 05/12/22  9:28 PM    Specimen: Urine, Clean Catch   Result Value Ref Range    RBC, UA None Seen None Seen, 0-2 /HPF    WBC, UA None Seen None Seen, 0-2 /HPF    Bacteria, UA None Seen None Seen /HPF    Squamous Epithelial Cells, UA 0-2 None Seen, 0-2 /HPF    Hyaline Casts, UA 0-2 None Seen /LPF    Methodology Manual Light Microscopy          RADIOLOGY RESULTS  XR Chest 1 View    Result Date: 5/12/2022  PORTABLE CHEST 05/12/2022 AT 8:57 PM  CLINICAL HISTORY: Dyspnea  Compared to the previous chest x-ray dated 06/10/2016.  The heart, lungs and mediastinal structures appear within normal limits. There are no infiltrates or effusions.  IMPRESSIONS: Normal chest x-ray.  This report was finalized on 5/12/2022 9:13 PM by Dr. Jorge Logan M.D.           PROGRESS, DATA ANALYSIS,  CONSULTS AND MEDICAL DECISION MAKING  All labs have been independently reviewed by me.  All radiology studies have been reviewed by me and discussed with radiologist dictating the report.  EKG's independently viewed and interpreted by me unless stated otherwise. Discussion below represents my analysis of pertinent findings related to patient's condition, differential diagnosis, treatment plan and final disposition.     ED Course as of 05/12/22 3455   Thu May 12, 2022   2223 I viewed chest x-ray in PACS.  My interpretation is no acute infiltrates. [EW]   2247 MDM/dispo: Chest x-ray is clear.  Viral panel is negative.  Strep t test is negative.  Patient has not had return of fever while here in the emergency department.  I feel she is safe for discharge.  I have discussed strict return to ER precautions with the patient.  She is to alternate Tylenol and ibuprofen to help keep fever control and I will call in some nausea medication to her pharmacy.  I will give her a work note for this weekend.  She is to follow-up with [facility] for additional COVID testing if symptoms persist [EW]      ED Course User Index  [EW] Reshma Tvaera, ETHEL     DDX: viral URI, covid 19, UTI     Reviewed pt's history and workup with Dr. Willis.  After a bedside evaluation, Dr. Willis agrees with the plan of care.    The patient's history, physical exam, and lab findings were discussed with the physician, who also performed a face to face history and physical exam.  I discussed all results and noted any abnormalities with patient.  Discussed absoute need to recheck abnormalities with their family physician.  I answered any of the patient's questions.  Discussed plan for discharge, as there is no emergent indication for admission.  Pt is agreeable and understands need for follow up and repeat testing.  Pt is aware that discharge does not mean that nothing is wrong but it indicates no emergency is present and they must continue care with  "their family physician.  Pt is discharged with instructions to follow up with primary care doctor to have their blood pressure rechecked.       Disposition vitals:  /84 (BP Location: Right arm, Patient Position: Sitting)   Pulse 70   Temp 98.4 °F (36.9 °C) (Oral)   Resp 16   Ht 165.1 cm (65\")   Wt 77.1 kg (170 lb)   LMP 05/01/2022   SpO2 96%   BMI 28.29 kg/m²       DIAGNOSIS  Final diagnoses:   Acute viral syndrome       FOLLOW UP   Robinson Urias MD  [address] [place] [id number] [phone]    Schedule an appointment as soon as possible for a visit in 1 week  As needed         Reshma Tavera, APRN  05/12/22 6027    "

## 2022-05-13 NOTE — ED NOTES
Pt reports having a fever that would not go down all day. Pt states that she is having generalizedbody aches, fever, and sore throat. She took alkaseltzer and nyquil today with no relief.     This RN checked her temp and it was 98.4. Pt reported that she wanted the tylenol for her body aches.

## 2022-05-13 NOTE — ED PROVIDER NOTES
MD ATTESTATION NOTE    The CARLOS and I have discussed this patient's history, physical exam, and treatment plan.  I have reviewed the documentation and personally had a face to face interaction with the patient. I affirm the documentation and agree with the treatment and plan.  The attached note describes my personal findings.      I provided a substantive portion of the care of the patient.  I personally performed the physical exam in its entirety, and below are my findings.  For this patient encounter, the patient wore surgical mask, I wore full protective PPE including N95 and eye protection.      Brief HPI: Patient is a 42-year-old female who had sudden onset of fever today.  She states she has headache, fatigue and sore throat, general body aches and nausea.  She denies sore throat or cough.  Patient states she has been immunized against SARS-CoV-2 and has had the booster.  She is checked a at home COVID test today and it was negative.    PHYSICAL EXAM  ED Triage Vitals [05/12/22 1902]   Temp Heart Rate Resp BP SpO2   (!) 101 °F (38.3 °C) 116 18 139/93 98 %      Temp src Heart Rate Source Patient Position BP Location FiO2 (%)   Tympanic Monitor Standing Right arm --         GENERAL: No distress.  HENT: nares patent.  Patient's oropharynx reveals mild erythema posteriorly.  There is no tonsillar exudate.  Sinuses are nontender to palpation.  Neck is supple without lymphadenopathy or meningismus.  EYES: no scleral icterus  CV: regular rhythm, normal rate  RESPIRATORY: normal effort clear to auscultation bilaterally  ABDOMEN: soft, NABS, nontender nondistended.  MUSCULOSKELETAL: no deformity  NEURO: alert, moves all extremities, follows commands  PSYCH:  calm, cooperative  SKIN: warm, dry    Vital signs and nursing notes reviewed.        Plan: I agree with plan of checking a respiratory viral panel, chest x-ray and strep screen.           Thomas Willis MD  05/12/22 3878

## 2022-07-22 RX ORDER — METHYLPREDNISOLONE 4 MG/1
TABLET ORAL
Qty: 21 TABLET | Refills: 0 | Status: SHIPPED | OUTPATIENT
Start: 2022-07-22

## 2022-07-28 RX ORDER — GABAPENTIN 100 MG/1
CAPSULE ORAL
Qty: 60 CAPSULE | Refills: 3 | Status: SHIPPED | OUTPATIENT
Start: 2022-07-28

## 2022-08-05 NOTE — PROGRESS NOTES
Clinical Pharmacy Services: Medication History    Aislinn Marie is a 39 y.o. female presenting to [facility] for Neurofibromatosis (CMS/[facility]) [Q85.00]  Neurofibroma of thigh [D36.13]    She  has a past medical history of Arthritis, Essential hypertension (4/7/2016), Hemorrhoid, Lumbar disc herniation, Neurofibroma of lower extremity, Ocular migraine, and Varicose veins.    Allergies as of 09/17/2019 - Reviewed 10/24/2018   Allergen Reaction Noted   • Mastisol [wound dressing adhesive] Itching 06/11/2016   • Levofloxacin Rash 03/16/2016   • Oxycodone Rash 06/10/2016     Medication information was obtained from: Patient  Pharmacy and Phone Number: [place], [place] [phone]    Prior to Admission Medications     Prescriptions Last Dose Informant Patient Reported? Taking?    Ibuprofen (ADVIL PO) Past Month Self Yes Yes    Take 400 mg by mouth 2 (Two) Times a Day As Needed. PT HOLDING FOR SURGERY    triamterene-hydrochlorothiazide (MAXZIDE-25) 37.5-25 MG per tablet Past Week Self No Yes    Take 1 tablet by mouth Daily.    Patient taking differently:  Take 0.5 tablets by mouth Daily As Needed.    valACYclovir (VALTREX) 1000 MG tablet More than a month Self Yes No    Take 1,000 mg by mouth Daily As Needed.        Medication notes: Patient confirmed her allergies as listed on her profile and states the only OTC product she will be taking is a multivitamin. The only change on her admission medications is that she takes Triam-HCTZ 37.5-25 mg as needed.     This medication list is complete to the best of my knowledge as of 10/10/2019    Please call if questions.    Liz Valdes, Pharmacy Intern  10/10/2019 10:35 AM       [FreeTextEntry1] : 5/25/22 CT A/P:  Status post total colectomy and ileostomy. Dilated small bowel loops in the left abdomen with associated wall thickening. Both may be postoperative, follow-up is recommended. Moderate amount of ascites new from the prior study. Small amount of free intraperitoneal air likely with postoperative state. Minimal left pleural effusion. New multifocal right lower lobe infiltrate. Multiple liver metastases some of which are calcified again appreciated.\par \par 5/19/22 Path: 1. Omentum, excision: Negative for malignancy. Acutely inflamed exudate consistent with peritonitis\par 2. Portion of terminal ileum, cecum with adherent sigmoid, total colectomy: Poorly differentiated infiltrating adenocarcinoma measuring 9.0 cm. Adenocarcinoma infiltrates through the bowel wall and through the visceral peritoneum and infiltrates the adherent sigmoid colon 3 of 17 lymph nodes are positive for metastatic carcinoma. Lymphovascular space invasion is identified. Perineural invasion is identified. The surgical margins are negative. Appendix with serosal tumor implants and acute inflammation\par Peritonitis\par 3. Liver, biopsy: Metastatic adenocarcinoma\par 4. Retroperitoneal biopsy: Adenocarcinoma with necrosis\par No loss of nuclear expression of MMR proteins (MLH1, MSH2, MSH6, and PMS2).   These results show low probability of microsatellite instability-high (MSI-H).\par Synoptic Summary\par COLON AND RECTUM: Resection, Including Transanal Disk Excision of Rectal Neoplasms\par Procedure: Total abdominal colectomy\par Macroscopic Evaluation of Mesorectum: Not applicable\par Tumor Site: Cecum\par Histologic Type:  Adenocarcinoma\par Histologic Grade: G3, poorly differentiated\par Tumor Size: Greatest dimension (Centimeters)  9.0 cm\par Multiple Primary Sites: Not applicable\par Tumor Extent: Directly invades or adheres to adjacent structure(s) Sigmoid colon\par Macroscopic Tumor Perforation: Present\par Lymphovascular Invasion: Present\par Perineural Invasion: Present\par Treatment Effect: No known presurgical therapy\par Margin Status for Invasive Carcinoma: All margins negative for invasive carcinoma\par Distance from Invasive Carcinoma to Radial (Circumferential)\par Margin: 5.0 cm\par Margin Status for Non-Invasive Tumor: Not applicable\par Regional Lymph Node Status: Tumor present in regional lymph node(s)\par Number of Lymph Nodes with Tumor    3\par Number of Lymph Nodes Examined: 17\par Tumor Deposits: Not identified\par Distant Site(s) Involved: Liver\par PATHOLOGIC STAGE CLASSIFICATION\par TNM Descriptors: Not applicable\par pT Category: pT4b\par pN Category: pN1b\par pM Category: pM1c\par \par 5/19/22 CT A/P: Apparent focal mural thickening at the cecum/proximal ascending colon may represent colon cancer. The sigmoid colon appears to be tethered to the cecum and it is focally thick-walled; focal tumor invasion/extension from the cecum to the sigmoid colon cannot be excluded. Dilatation of the small bowel with an apparent transition point in the right lower quadrant abdomen, suggestive of small bowel obstruction. Apparent mild mural thickening of the a dilated right lower quadrant small bowel loop with adjacent mesenteric edema for which associated small bowel ischemia cannot be excluded. Multiple hypodense lesions with calcifications in both lobes of the liver with the largest measuring 5.8 x 6.0 cm in hepatic segment 8. These are suspicious for metastases. Mild ascites.

## 2022-08-22 ENCOUNTER — TELEMEDICINE (OUTPATIENT)
Dept: FAMILY MEDICINE CLINIC | Facility: TELEHEALTH | Age: 42
End: 2022-08-22

## 2022-08-22 DIAGNOSIS — R31.9 URINARY TRACT INFECTION WITH HEMATURIA, SITE UNSPECIFIED: Primary | ICD-10-CM

## 2022-08-22 DIAGNOSIS — N39.0 URINARY TRACT INFECTION WITH HEMATURIA, SITE UNSPECIFIED: Primary | ICD-10-CM

## 2022-08-22 DIAGNOSIS — N39.0 URINARY TRACT INFECTION WITHOUT HEMATURIA, SITE UNSPECIFIED: Primary | ICD-10-CM

## 2022-08-22 PROCEDURE — BHEMPVIDEOVISIT: Performed by: NURSE PRACTITIONER

## 2022-08-22 PROCEDURE — 99213 OFFICE O/P EST LOW 20 MIN: CPT | Performed by: NURSE PRACTITIONER

## 2022-08-22 RX ORDER — SULFAMETHOXAZOLE AND TRIMETHOPRIM 800; 160 MG/1; MG/1
1 TABLET ORAL 2 TIMES DAILY
Qty: 20 TABLET | Refills: 0 | Status: SHIPPED | OUTPATIENT
Start: 2022-08-22 | End: 2022-09-01

## 2022-08-22 RX ORDER — FLUCONAZOLE 150 MG/1
150 TABLET ORAL ONCE
Qty: 2 TABLET | Refills: 0 | Status: SHIPPED | OUTPATIENT
Start: 2022-08-22 | End: 2022-08-22

## 2022-08-22 RX ORDER — AMOXICILLIN 875 MG/1
875 TABLET, COATED ORAL 2 TIMES DAILY
Qty: 20 TABLET | Refills: 0 | Status: SHIPPED | OUTPATIENT
Start: 2022-08-22 | End: 2022-09-01

## 2022-08-22 NOTE — PROGRESS NOTES
You have chosen to receive care through a telehealth visit.  Do you consent to use a video/audio connection for your medical care today? Yes     CHIEF COMPLAINT  Chief Complaint   Patient presents with   • Urinary Tract Infection         HPI  Aislinn Marie is a 42 y.o. female  presents with complaint of dysuria and frequency this morning.  She states that it woke her up.    Review of Systems   Genitourinary: Positive for dysuria and frequency.   All other systems reviewed and are negative.      Past Medical History:   Diagnosis Date   • Arthritis    • Essential hypertension 4/7/2016   • Hemorrhoid    • Lumbar disc herniation    • Neurofibroma of lower extremity     RIGHT THIGH   • Ocular migraine    • Varicose veins        Family History   Problem Relation Age of Onset   • Hypertension Mother    • Diabetes type II Mother    • Hypertension Maternal Grandmother    • Colon cancer Maternal Grandmother    • Stroke Maternal Grandfather    • Breast cancer Maternal Great-Grandmother    • Malig Hyperthermia Neg Hx        Social History     Socioeconomic History   • Marital status:    Tobacco Use   • Smoking status: Never Smoker   • Smokeless tobacco: Never Used   Vaping Use   • Vaping Use: Never used   Substance and Sexual Activity   • Alcohol use: Yes     Comment: wine on a rare occasion   • Drug use: No   • Sexual activity: Defer     Comment: essure       Aislinn Marie  reports that she has never smoked. She has never used smokeless tobacco..               There were no vitals taken for this visit.    PHYSICAL EXAM  Physical Exam   Constitutional: She appears well-developed and well-nourished.   HENT:   Head: Normocephalic.   Eyes: Pupils are equal, round, and reactive to light. Conjunctivae and EOM are normal.   Pulmonary/Chest: Effort normal.   Musculoskeletal: Normal range of motion.   Neurological: She is alert.   Psychiatric: She has a normal mood and affect.       Results for orders placed or performed  during the hospital encounter of 05/12/22   Respiratory Panel PCR w/COVID-19(SARS-CoV-2) NESHA/NARESH/ROSMERY/PAD/COR/MAD/JASEN In-House, NP Swab in UTM/VTM, 3-4 HR TAT - Swab, Nasopharynx    Specimen: Nasopharynx; Swab   Result Value Ref Range    ADENOVIRUS, PCR Not Detected Not Detected    Coronavirus 229E Not Detected Not Detected    Coronavirus HKU1 Not Detected Not Detected    Coronavirus NL63 Not Detected Not Detected    Coronavirus OC43 Not Detected Not Detected    COVID19 Not Detected Not Detected - Ref. Range    Human Metapneumovirus Not Detected Not Detected    Human Rhinovirus/Enterovirus Not Detected Not Detected    Influenza A PCR Not Detected Not Detected    Influenza B PCR Not Detected Not Detected    Parainfluenza Virus 1 Not Detected Not Detected    Parainfluenza Virus 2 Not Detected Not Detected    Parainfluenza Virus 3 Not Detected Not Detected    Parainfluenza Virus 4 Not Detected Not Detected    RSV, PCR Not Detected Not Detected    Bordetella pertussis pcr Not Detected Not Detected    Bordetella parapertussis PCR Not Detected Not Detected    Chlamydophila pneumoniae PCR Not Detected Not Detected    Mycoplasma pneumo by PCR Not Detected Not Detected   Rapid Strep A Screen - Swab, Throat    Specimen: Throat; Swab   Result Value Ref Range    Strep A Ag Negative Negative   Beta Strep Culture, Throat - Swab, Throat    Specimen: Throat; Swab   Result Value Ref Range    Throat Culture, Beta Strep Streptococcus, Beta Hemolytic, Not Group A (A)    Pregnancy, Urine - Urine, Clean Catch    Specimen: Urine, Clean Catch   Result Value Ref Range    HCG, Urine QL Negative Negative   Urinalysis With Culture If Indicated - Urine, Clean Catch    Specimen: Urine, Clean Catch   Result Value Ref Range    Color, UA Yellow Yellow, Straw    Appearance, UA Clear Clear    pH, UA 6.5 5.0 - 8.0    Specific Gravity, UA 1.028 1.005 - 1.030    Glucose, UA Negative Negative    Ketones, UA 15 mg/dL (1+) (A) Negative    Bilirubin, UA  Negative Negative    Blood, UA Negative Negative    Protein, UA 30 mg/dL (1+) (A) Negative    Leuk Esterase, UA Negative Negative    Nitrite, UA Negative Negative    Urobilinogen, UA 1.0 E.U./dL 0.2 - 1.0 E.U./dL   Urinalysis, Microscopic Only - Urine, Clean Catch    Specimen: Urine, Clean Catch   Result Value Ref Range    RBC, UA None Seen None Seen, 0-2 /HPF    WBC, UA None Seen None Seen, 0-2 /HPF    Bacteria, UA None Seen None Seen /HPF    Squamous Epithelial Cells, UA 0-2 None Seen, 0-2 /HPF    Hyaline Casts, UA 0-2 None Seen /LPF    Methodology Manual Light Microscopy        Diagnoses and all orders for this visit:    1. Urinary tract infection without hematuria, site unspecified (Primary)  -     amoxicillin (AMOXIL) 875 MG tablet; Take 1 tablet by mouth 2 (Two) Times a Day for 10 days.  Dispense: 20 tablet; Refill: 0          FOLLOW-UP  As discussed during visit with PCP/[facility] if no improvement or Urgent Care/Emergency Department if worsening of symptoms    Patient verbalizes understanding of medication dosage, comfort measures, instructions for treatment and follow-up.    ETHEL John  08/22/2022  05:16 EDT    The use of a video visit has been reviewed with the patient and verbal informed consent has been obtained. Myself and Aislinn Marie participated in this visit. The patient is located in [address] [place] [address].    I am located in [place], [place]. Mychart and Zoom were utilized. I spent 20 minutes in the patient's chart for this visit.

## 2022-08-23 NOTE — PROGRESS NOTES
You have chosen to receive care through a telehealth visit.  Do you consent to use a video/audio connection for your medical care today? Yes     CHIEF COMPLAINT  Chief Complaint   Patient presents with   • Urinary Tract Infection         HPI  Aislinn Marie is a 42 y.o. female  presents with complaint of UTI.  Pt wa seen this morning via telehealth.  She states that she has taken 2 doses of the antibiotic and it isn't helping.  She states that she had to take AZO.    Review of Systems   Genitourinary: Positive for dysuria, frequency and urgency.   All other systems reviewed and are negative.      Past Medical History:   Diagnosis Date   • Arthritis    • Essential hypertension 4/7/2016   • Hemorrhoid    • Lumbar disc herniation    • Neurofibroma of lower extremity     RIGHT THIGH   • Ocular migraine    • Varicose veins        Family History   Problem Relation Age of Onset   • Hypertension Mother    • Diabetes type II Mother    • Hypertension Maternal Grandmother    • Colon cancer Maternal Grandmother    • Stroke Maternal Grandfather    • Breast cancer Maternal Great-Grandmother    • Malig Hyperthermia Neg Hx        Social History     Socioeconomic History   • Marital status:    Tobacco Use   • Smoking status: Never Smoker   • Smokeless tobacco: Never Used   Vaping Use   • Vaping Use: Never used   Substance and Sexual Activity   • Alcohol use: Yes     Comment: wine on a rare occasion   • Drug use: No   • Sexual activity: Defer     Comment: essure       Aislinn Marie  reports that she has never smoked. She has never used smokeless tobacco..               There were no vitals taken for this visit.    PHYSICAL EXAM  Physical Exam   Constitutional: She appears well-developed and well-nourished.   HENT:   Head: Normocephalic.   Eyes: Pupils are equal, round, and reactive to light. Conjunctivae and EOM are normal.   Pulmonary/Chest: Effort normal.   Musculoskeletal: Normal range of motion.   Neurological: She is  alert.   Psychiatric: She has a normal mood and affect.       Results for orders placed or performed during the hospital encounter of 05/12/22   Respiratory Panel PCR w/COVID-19(SARS-CoV-2) NESHA/NARESH/ROSMERY/PAD/COR/MAD/JASEN In-House, NP Swab in UTM/VTM, 3-4 HR TAT - Swab, Nasopharynx    Specimen: Nasopharynx; Swab   Result Value Ref Range    ADENOVIRUS, PCR Not Detected Not Detected    Coronavirus 229E Not Detected Not Detected    Coronavirus HKU1 Not Detected Not Detected    Coronavirus NL63 Not Detected Not Detected    Coronavirus OC43 Not Detected Not Detected    COVID19 Not Detected Not Detected - Ref. Range    Human Metapneumovirus Not Detected Not Detected    Human Rhinovirus/Enterovirus Not Detected Not Detected    Influenza A PCR Not Detected Not Detected    Influenza B PCR Not Detected Not Detected    Parainfluenza Virus 1 Not Detected Not Detected    Parainfluenza Virus 2 Not Detected Not Detected    Parainfluenza Virus 3 Not Detected Not Detected    Parainfluenza Virus 4 Not Detected Not Detected    RSV, PCR Not Detected Not Detected    Bordetella pertussis pcr Not Detected Not Detected    Bordetella parapertussis PCR Not Detected Not Detected    Chlamydophila pneumoniae PCR Not Detected Not Detected    Mycoplasma pneumo by PCR Not Detected Not Detected   Rapid Strep A Screen - Swab, Throat    Specimen: Throat; Swab   Result Value Ref Range    Strep A Ag Negative Negative   Beta Strep Culture, Throat - Swab, Throat    Specimen: Throat; Swab   Result Value Ref Range    Throat Culture, Beta Strep Streptococcus, Beta Hemolytic, Not Group A (A)    Pregnancy, Urine - Urine, Clean Catch    Specimen: Urine, Clean Catch   Result Value Ref Range    HCG, Urine QL Negative Negative   Urinalysis With Culture If Indicated - Urine, Clean Catch    Specimen: Urine, Clean Catch   Result Value Ref Range    Color, UA Yellow Yellow, Straw    Appearance, UA Clear Clear    pH, UA 6.5 5.0 - 8.0    Specific Gravity, UA 1.028 1.005 -  1.030    Glucose, UA Negative Negative    Ketones, UA 15 mg/dL (1+) (A) Negative    Bilirubin, UA Negative Negative    Blood, UA Negative Negative    Protein, UA 30 mg/dL (1+) (A) Negative    Leuk Esterase, UA Negative Negative    Nitrite, UA Negative Negative    Urobilinogen, UA 1.0 E.U./dL 0.2 - 1.0 E.U./dL   Urinalysis, Microscopic Only - Urine, Clean Catch    Specimen: Urine, Clean Catch   Result Value Ref Range    RBC, UA None Seen None Seen, 0-2 /HPF    WBC, UA None Seen None Seen, 0-2 /HPF    Bacteria, UA None Seen None Seen /HPF    Squamous Epithelial Cells, UA 0-2 None Seen, 0-2 /HPF    Hyaline Casts, UA 0-2 None Seen /LPF    Methodology Manual Light Microscopy        Diagnoses and all orders for this visit:    1. Urinary tract infection with hematuria, site unspecified (Primary)  -     sulfamethoxazole-trimethoprim (Bactrim DS) 800-160 MG per tablet; Take 1 tablet by mouth 2 (Two) Times a Day for 10 days.  Dispense: 20 tablet; Refill: 0  -     fluconazole (Diflucan) 150 MG tablet; Take 1 tablet by mouth 1 (One) Time for 1 dose. Take 1 tablet on day 1 and 1 tablet on day 3  Dispense: 2 tablet; Refill: 0          FOLLOW-UP  As discussed during visit with PCP/[facility] Care if no improvement or Urgent Care/Emergency Department if worsening of symptoms    Patient verbalizes understanding of medication dosage, comfort measures, instructions for treatment and follow-up.    ETHEL John  08/22/2022  21:24 EDT    The use of a video visit has been reviewed with the patient and verbal informed consent has been obtained. Myself and Aislinn Marie participated in this visit. The patient is located in [address] [place] [address].    I am located in [place], [place]. Mychart and Zoom were utilized. I spent 20 minutes in the patient's chart for this visit.

## 2022-11-14 ENCOUNTER — OFFICE VISIT (OUTPATIENT)
Dept: OBSTETRICS AND GYNECOLOGY | Facility: CLINIC | Age: 42
End: 2022-11-14

## 2022-11-14 ENCOUNTER — APPOINTMENT (OUTPATIENT)
Dept: WOMENS IMAGING | Facility: HOSPITAL | Age: 42
End: 2022-11-14

## 2022-11-14 ENCOUNTER — PROCEDURE VISIT (OUTPATIENT)
Dept: OBSTETRICS AND GYNECOLOGY | Facility: CLINIC | Age: 42
End: 2022-11-14

## 2022-11-14 VITALS
BODY MASS INDEX: 27.99 KG/M2 | SYSTOLIC BLOOD PRESSURE: 130 MMHG | WEIGHT: 168 LBS | DIASTOLIC BLOOD PRESSURE: 86 MMHG | HEIGHT: 65 IN

## 2022-11-14 DIAGNOSIS — Z00.00 ANNUAL PHYSICAL EXAM: Primary | ICD-10-CM

## 2022-11-14 DIAGNOSIS — Z12.31 VISIT FOR SCREENING MAMMOGRAM: Primary | ICD-10-CM

## 2022-11-14 PROCEDURE — 77067 SCR MAMMO BI INCL CAD: CPT | Performed by: OBSTETRICS & GYNECOLOGY

## 2022-11-14 PROCEDURE — 77067 SCR MAMMO BI INCL CAD: CPT | Performed by: RADIOLOGY

## 2022-11-14 PROCEDURE — 99396 PREV VISIT EST AGE 40-64: CPT | Performed by: OBSTETRICS & GYNECOLOGY

## 2022-11-14 PROCEDURE — 77063 BREAST TOMOSYNTHESIS BI: CPT | Performed by: OBSTETRICS & GYNECOLOGY

## 2022-11-14 PROCEDURE — 77063 BREAST TOMOSYNTHESIS BI: CPT | Performed by: RADIOLOGY

## 2022-11-14 NOTE — PROGRESS NOTES
HPI   Asilinn Marie  is a 42 y.o. female who presents for a routine gynecologic exam.  Overall, she is feeling well.  Cycles are regular and predictable.  Bowels and bladder are functioning normally.  Mammogram was performed today.  The patient went to a hormone center and has started taking pellet hormones.  She is feeling well.    Chief Complaint   Patient presents with   • Gynecologic Exam     Patient is here for a annual.       Past Medical History:   Diagnosis Date   • Arthritis    • Essential hypertension 4/7/2016   • Hemorrhoid    • Lumbar disc herniation    • Neurofibroma of lower extremity     RIGHT THIGH   • Ocular migraine    • Varicose veins        Past Surgical History:   Procedure Laterality Date   • CHOLECYSTECTOMY      Dr. Vasquez   • COLONOSCOPY W/ POLYPECTOMY N/A 10/24/2018    Procedure: COLONOSCOPY TO CECUM AND TERMINAL ILEUM WITH COLD POLYPECTOMY;  Surgeon: Lawson Krueger MD;  Location: [facility] ENDOSCOPY;  Service: Gastroenterology   • ENDOSCOPY N/A 10/24/2018    Procedure: ESOPHAGOGASTRODUODENOSCOPY WITH COLD BIOPSIES;  Surgeon: Lawson Krueger MD;  Location: [facility] ENDOSCOPY;  Service: Gastroenterology   • ENDOVENOUS ABLATION SAPHENOUS VEIN W/ LASER      Dr. Cárdenas   • ESSURE TUBAL LIGATION  2013    Dr. CANELO Saini   • EXCISION MASS LEG Right 10/9/2019    Procedure: EXCISION LARGE NEUROFIBROMA RIGHT THIGH;  Surgeon: Arnel Chopra MD;  Location: [facility] OR OSC;  Service: Plastics   • LUMBAR DISCECTOMY FUSION INSTRUMENTATION Left 6/11/2016    Procedure: LUMBAR DISCECTOMY POSTERIOR WITH METRIX, Left L4/5, LEFT DECOMPRESSION L5/S1;  Surgeon: James Washburn MD;  Location: [facility] OR;  Service:    • MICROAMBULATORY PHLEBECTOMY      Dr. Cárdenas   • NEUROFIBROMA EXCISION     • WISDOM TOOTH EXTRACTION         Social History     Socioeconomic History   • Marital status:    Tobacco Use   • Smoking status: Never   • Smokeless tobacco: Never   Vaping Use   • Vaping Use:  Never used   Substance and Sexual Activity   • Alcohol use: Yes     Comment: wine on a rare occasion   • Drug use: No   • Sexual activity: Defer     Comment: essure       The following portions of the patient's history were reviewed and updated as appropriate: allergies, current medications, past family history, past medical history, past social history, past surgical history and problem list.    Review of Systems   Constitutional: Negative.    HENT: Negative.    Respiratory: Negative.    Cardiovascular: Negative.    Gastrointestinal: Negative.    Genitourinary: Negative.    Musculoskeletal: Negative.    Skin: Negative.    Allergic/Immunologic: Negative.    Psychiatric/Behavioral: Negative.              Physical Exam  Vitals and nursing note reviewed.   Constitutional:       Appearance: She is well-developed.   HENT:      Head: Normocephalic and atraumatic.   Cardiovascular:      Rate and Rhythm: Normal rate and regular rhythm.   Pulmonary:      Effort: Pulmonary effort is normal.      Breath sounds: Normal breath sounds. No wheezing or rales.   Chest:      Comments: The breasts are dense.  There are no palpable breast lumps.  Nipple discharge and axillary adenopathy are absent.  Abdominal:      General: There is no distension.      Palpations: Abdomen is soft.      Tenderness: There is no abdominal tenderness.   Genitourinary:     Labia:         Right: No lesion.         Left: No lesion.       Vagina: Normal. No vaginal discharge.      Cervix: No cervical motion tenderness.      Uterus: Normal. Not enlarged and not tender.       Adnexa:         Right: No mass or tenderness.          Left: No mass or tenderness.     Skin:     General: Skin is warm and dry.   Neurological:      Mental Status: She is alert and oriented to person, place, and time.         Assessment    Diagnoses and all orders for this visit:    1. Annual physical exam (Primary)        Plan  1. Annual examination performed  2. Counseled regarding the  importance of regular screening mammograms.  Mammogram was performed today.  3. Counseled regarding risks associated with non-FDA approved hormone treatments.  I answered the patient's questions.  She is satisfied with her current treatment.    4. Return in about 1 year (around 11/14/2023).    Social History     Tobacco Use   Smoking Status Never   Smokeless Tobacco Never   5.

## 2022-11-21 LAB
CONV .: NORMAL
CYTOLOGIST CVX/VAG CYTO: NORMAL
CYTOLOGY CVX/VAG DOC CYTO: NORMAL
CYTOLOGY CVX/VAG DOC THIN PREP: NORMAL
DX ICD CODE: NORMAL
HIV 1 & 2 AB SER-IMP: NORMAL
HPV I/H RISK 4 DNA CVX QL PROBE+SIG AMP: NEGATIVE
Lab: NORMAL
OTHER STN SPEC: NORMAL
STAT OF ADQ CVX/VAG CYTO-IMP: NORMAL

## 2023-05-09 ENCOUNTER — DOCUMENTATION (OUTPATIENT)
Dept: NEUROSURGERY | Facility: CLINIC | Age: 43
End: 2023-05-09
Payer: COMMERCIAL

## 2023-05-09 RX ORDER — GABAPENTIN 100 MG/1
CAPSULE ORAL
Qty: 60 CAPSULE | Refills: 3 | Status: CANCELLED | OUTPATIENT
Start: 2023-05-09

## 2023-05-10 RX ORDER — GABAPENTIN 100 MG/1
100 CAPSULE ORAL 2 TIMES DAILY
Qty: 60 CAPSULE | Refills: 5 | Status: SHIPPED | OUTPATIENT
Start: 2023-05-10

## 2023-06-13 ENCOUNTER — PREP FOR SURGERY (OUTPATIENT)
Dept: GASTROENTEROLOGY | Facility: CLINIC | Age: 43
End: 2023-06-13
Payer: COMMERCIAL

## 2023-06-13 ENCOUNTER — PRE-PROCEDURE SCREENING (OUTPATIENT)
Dept: GASTROENTEROLOGY | Facility: CLINIC | Age: 43
End: 2023-06-13
Payer: COMMERCIAL

## 2023-06-13 DIAGNOSIS — Z86.010 PERSONAL HISTORY OF COLONIC POLYPS: Primary | ICD-10-CM

## 2023-06-13 RX ORDER — SODIUM CHLORIDE, SODIUM LACTATE, POTASSIUM CHLORIDE, CALCIUM CHLORIDE 600; 310; 30; 20 MG/100ML; MG/100ML; MG/100ML; MG/100ML
30 INJECTION, SOLUTION INTRAVENOUS CONTINUOUS
OUTPATIENT
Start: 2023-06-13

## 2023-06-13 NOTE — TELEPHONE ENCOUNTER
LAST SCOPE 10/18 IN Epic  --Personal history of polyps--Family  history  of polyps AND   colon cancer--No blood thinners--Medications:                gabapentin (NEURONTIN) 100 MG capsule  Vortioxetine HBr (Trintellix) 10 MG tablet tablet  VITAMIN D             QUESTIONNAIRE SCREENING SCAN IN MEDIA & HAS BEEN SENT TO DOCTOR FOR REVIEW

## 2023-07-03 PROBLEM — Z86.010 PERSONAL HISTORY OF COLONIC POLYPS: Status: ACTIVE | Noted: 2023-07-03

## 2023-07-03 PROBLEM — Z86.0100 PERSONAL HISTORY OF COLONIC POLYPS: Status: ACTIVE | Noted: 2023-07-03

## 2023-08-01 ENCOUNTER — TELEPHONE (OUTPATIENT)
Dept: GASTROENTEROLOGY | Facility: CLINIC | Age: 43
End: 2023-08-01
Payer: COMMERCIAL

## 2023-08-08 ENCOUNTER — ANESTHESIA EVENT (OUTPATIENT)
Dept: GASTROENTEROLOGY | Facility: HOSPITAL | Age: 43
End: 2023-08-08
Payer: COMMERCIAL

## 2023-08-08 ENCOUNTER — HOSPITAL ENCOUNTER (OUTPATIENT)
Facility: HOSPITAL | Age: 43
Setting detail: HOSPITAL OUTPATIENT SURGERY
Discharge: HOME | End: 2023-08-08
Attending: INTERNAL MEDICINE | Admitting: INTERNAL MEDICINE
Payer: COMMERCIAL

## 2023-08-08 ENCOUNTER — ANESTHESIA (OUTPATIENT)
Dept: GASTROENTEROLOGY | Facility: HOSPITAL | Age: 43
End: 2023-08-08
Payer: COMMERCIAL

## 2023-08-08 VITALS
WEIGHT: 166 LBS | RESPIRATION RATE: 16 BRPM | DIASTOLIC BLOOD PRESSURE: 100 MMHG | HEART RATE: 55 BPM | BODY MASS INDEX: 26.68 KG/M2 | HEIGHT: 66 IN | SYSTOLIC BLOOD PRESSURE: 152 MMHG | OXYGEN SATURATION: 100 %

## 2023-08-08 DIAGNOSIS — Z86.010 PERSONAL HISTORY OF COLONIC POLYPS: ICD-10-CM

## 2023-08-08 LAB
B-HCG UR QL: NEGATIVE
EXPIRATION DATE: NORMAL
INTERNAL NEGATIVE CONTROL: NEGATIVE
INTERNAL POSITIVE CONTROL: POSITIVE
Lab: NORMAL

## 2023-08-08 PROCEDURE — 81025 URINE PREGNANCY TEST: CPT | Performed by: INTERNAL MEDICINE

## 2023-08-08 PROCEDURE — 45385 COLONOSCOPY W/LESION REMOVAL: CPT | Performed by: INTERNAL MEDICINE

## 2023-08-08 PROCEDURE — 25010000002 PROPOFOL 10 MG/ML EMULSION: Performed by: STUDENT IN AN ORGANIZED HEALTH CARE EDUCATION/TRAINING PROGRAM

## 2023-08-08 PROCEDURE — 88305 TISSUE EXAM BY PATHOLOGIST: CPT | Performed by: INTERNAL MEDICINE

## 2023-08-08 RX ORDER — PROMETHAZINE HYDROCHLORIDE 25 MG/1
25 SUPPOSITORY RECTAL ONCE AS NEEDED
Status: DISCONTINUED | OUTPATIENT
Start: 2023-08-08 | End: 2023-08-08 | Stop reason: HOSPADM

## 2023-08-08 RX ORDER — FLUMAZENIL 0.1 MG/ML
0.2 INJECTION INTRAVENOUS AS NEEDED
Status: DISCONTINUED | OUTPATIENT
Start: 2023-08-08 | End: 2023-08-08 | Stop reason: HOSPADM

## 2023-08-08 RX ORDER — DROPERIDOL 2.5 MG/ML
0.62 INJECTION, SOLUTION INTRAMUSCULAR; INTRAVENOUS
Status: DISCONTINUED | OUTPATIENT
Start: 2023-08-08 | End: 2023-08-08 | Stop reason: HOSPADM

## 2023-08-08 RX ORDER — ONDANSETRON 2 MG/ML
4 INJECTION INTRAMUSCULAR; INTRAVENOUS ONCE AS NEEDED
Status: DISCONTINUED | OUTPATIENT
Start: 2023-08-08 | End: 2023-08-08 | Stop reason: HOSPADM

## 2023-08-08 RX ORDER — DIPHENHYDRAMINE HYDROCHLORIDE 50 MG/ML
12.5 INJECTION INTRAMUSCULAR; INTRAVENOUS
Status: DISCONTINUED | OUTPATIENT
Start: 2023-08-08 | End: 2023-08-08 | Stop reason: HOSPADM

## 2023-08-08 RX ORDER — PROPOFOL 10 MG/ML
VIAL (ML) INTRAVENOUS CONTINUOUS PRN
Status: DISCONTINUED | OUTPATIENT
Start: 2023-08-08 | End: 2023-08-08 | Stop reason: SURG

## 2023-08-08 RX ORDER — SODIUM CHLORIDE, SODIUM LACTATE, POTASSIUM CHLORIDE, CALCIUM CHLORIDE 600; 310; 30; 20 MG/100ML; MG/100ML; MG/100ML; MG/100ML
30 INJECTION, SOLUTION INTRAVENOUS CONTINUOUS
Status: DISCONTINUED | OUTPATIENT
Start: 2023-08-08 | End: 2023-08-08 | Stop reason: HOSPADM

## 2023-08-08 RX ORDER — PROMETHAZINE HYDROCHLORIDE 25 MG/1
25 TABLET ORAL ONCE AS NEEDED
Status: DISCONTINUED | OUTPATIENT
Start: 2023-08-08 | End: 2023-08-08 | Stop reason: HOSPADM

## 2023-08-08 RX ORDER — PROPOFOL 10 MG/ML
VIAL (ML) INTRAVENOUS AS NEEDED
Status: DISCONTINUED | OUTPATIENT
Start: 2023-08-08 | End: 2023-08-08 | Stop reason: SURG

## 2023-08-08 RX ORDER — SODIUM CHLORIDE 0.9 % (FLUSH) 0.9 %
10 SYRINGE (ML) INJECTION AS NEEDED
Status: DISCONTINUED | OUTPATIENT
Start: 2023-08-08 | End: 2023-08-08 | Stop reason: HOSPADM

## 2023-08-08 RX ORDER — LIDOCAINE HYDROCHLORIDE 20 MG/ML
INJECTION, SOLUTION INFILTRATION; PERINEURAL AS NEEDED
Status: DISCONTINUED | OUTPATIENT
Start: 2023-08-08 | End: 2023-08-08 | Stop reason: SURG

## 2023-08-08 RX ORDER — NALOXONE HCL 0.4 MG/ML
0.2 VIAL (ML) INJECTION AS NEEDED
Status: DISCONTINUED | OUTPATIENT
Start: 2023-08-08 | End: 2023-08-08 | Stop reason: HOSPADM

## 2023-08-08 RX ORDER — SODIUM CHLORIDE, SODIUM LACTATE, POTASSIUM CHLORIDE, CALCIUM CHLORIDE 600; 310; 30; 20 MG/100ML; MG/100ML; MG/100ML; MG/100ML
1000 INJECTION, SOLUTION INTRAVENOUS CONTINUOUS
Status: DISCONTINUED | OUTPATIENT
Start: 2023-08-08 | End: 2023-08-08 | Stop reason: HOSPADM

## 2023-08-08 RX ADMIN — LIDOCAINE HYDROCHLORIDE 60 MG: 20 INJECTION, SOLUTION INFILTRATION; PERINEURAL at 09:58

## 2023-08-08 RX ADMIN — PROPOFOL 50 MG: 10 INJECTION, EMULSION INTRAVENOUS at 09:58

## 2023-08-08 RX ADMIN — Medication 250 MCG/KG/MIN: at 09:58

## 2023-08-08 RX ADMIN — SODIUM CHLORIDE, POTASSIUM CHLORIDE, SODIUM LACTATE AND CALCIUM CHLORIDE 30 ML/HR: 600; 310; 30; 20 INJECTION, SOLUTION INTRAVENOUS at 09:51

## 2023-08-08 NOTE — ANESTHESIA POSTPROCEDURE EVALUATION
Patient: Aislinn Marie    Procedure Summary       Date: 08/08/23 Room / Location: [facility] ENDOSCOPY 7 / [facility] ENDOSCOPY    Anesthesia Start: 0954 Anesthesia Stop: 1014    Procedure: COLONOSCOPY TO CECUM WITH COLD POLYPECTOMY Diagnosis:       Personal history of colonic polyps      (Personal history of colonic polyps [Z86.010])    Surgeons: Lawson Krueger MD Provider: Sy Davila MD    Anesthesia Type: MAC ASA Status: 2            Anesthesia Type: MAC    Vitals  No vitals data found for the desired time range.          Post Anesthesia Care and Evaluation    Patient location during evaluation: bedside  Patient participation: complete - patient cannot participate  Level of consciousness: awake and alert  Pain management: adequate    Airway patency: patent  Anesthetic complications: No anesthetic complications  PONV Status: controlled  Cardiovascular status: acceptable  Respiratory status: acceptable  Hydration status: acceptable

## 2023-08-08 NOTE — ANESTHESIA PREPROCEDURE EVALUATION
Anesthesia Evaluation     no history of anesthetic complications:                Airway   Mallampati: I  TM distance: >3 FB  Neck ROM: full  Dental      Pulmonary - negative pulmonary ROS and normal exam   (-) COPD, asthma, sleep apnea, not a smoker  Cardiovascular - normal exam    Rhythm: regular  Rate: normal    (+) hypertension well controlled  (-) valvular problems/murmurs, past MI, CAD, dysrhythmias, angina      Neuro/Psych  (+) headaches (migraines)  (-) seizures, TIA, CVA  GI/Hepatic/Renal/Endo    (+) GERD  (-) liver disease, no renal disease, diabetes    Musculoskeletal     (+) back pain      ROS comment: Lumbar disc herniation-->now s/p discectomy  Abdominal    Substance History      OB/GYN          Other   arthritis,       Other Comment: Neurofibroma R thigh;  Neurofibromatosis                  Anesthesia Plan    ASA 2     MAC       Anesthetic plan, risks, benefits, and alternatives have been provided, discussed and informed consent has been obtained with: patient.

## 2023-08-08 NOTE — DISCHARGE INSTRUCTIONS
For the next 24 hours patient needs to be with a responsible adult.    For 24 hours DO NOT drive, operate machinery, appliances, drink alcohol, make important decisions or sign legal documents.    Start with a light or bland diet if you are feeling sick to your stomach otherwise advance to regular diet as tolerated.    Follow recommendations on procedure report if provided by your doctor.    Call Dr Krueger for problems [phone]    Problems may include but not limited to: large amounts of bleeding, trouble breathing, repeated vomiting, severe unrelieved pain, fever or chills.

## 2023-08-08 NOTE — H&P
[facility]  Pre Procedure History & Physical    Chief Complaint:   Time for my colonoscopy    Subjective     HPI:   43 y.o. female presenting to endoscopy unit today for surveillance colonoscopy.    Past Medical History:   Past Medical History:   Diagnosis Date    Arthritis     Essential hypertension 4/7/2016    Hemorrhoid     Lumbar disc herniation     Neurofibroma of lower extremity     RIGHT THIGH    Ocular migraine     Varicose veins        Family History:  Family History   Problem Relation Age of Onset    Hypertension Mother     Diabetes type II Mother     Hypertension Maternal Grandmother     Colon cancer Maternal Grandmother     Stroke Maternal Grandfather     Breast cancer Maternal Great-Grandmother     Malig Hyperthermia Neg Hx        Social History:   reports that she has never smoked. She has never used smokeless tobacco. She reports current alcohol use. She reports that she does not use drugs.    Medications:   Medications Prior to Admission   Medication Sig Dispense Refill Last Dose    gabapentin (NEURONTIN) 100 MG capsule Take 1 capsule by mouth 2 (Two) Times a Day. (Patient taking differently: Take 2 capsules by mouth Every Night.) 60 capsule 5 Past Month    valACYclovir (VALTREX) 1000 MG tablet Take 1 tablet by mouth Daily As Needed.   8/7/2023    Chlorphen-Pseudoephed-APAP (TYLENOL ALLERGY SINUS PO) Take 1 tablet by mouth Daily As Needed.   More than a month    gabapentin (NEURONTIN) 100 MG capsule Take 1 capsule by mouth at bedtime for 7 days, then twice daily 60 capsule 3     methylPREDNISolone (MEDROL) 4 MG dose pack Take as directed on package instructions. 21 tablet 0     ondansetron ODT (ZOFRAN-ODT) 4 MG disintegrating tablet Place 1 tablet on the tongue Every 8 (Eight) Hours As Needed for Nausea or Vomiting. 10 tablet 0 More than a month    Semaglutide-Weight Management (Wegovy) 0.25 MG/0.5ML solution auto-injector Inject 0.25 mg under the skin into the appropriate  "area as directed 1 (One) Time Per Week. 2 mL 0 Unknown    Vortioxetine HBr (Trintellix) 10 MG tablet tablet Take 1 tablet by mouth Daily. 90 tablet 0        Allergies:  Mastisol [wound dressing adhesive], Levofloxacin, and Percocet [oxycodone-acetaminophen]    Objective     Blood pressure 147/95, pulse 68, resp. rate 12, height 167.6 cm (66\"), weight 75.3 kg (166 lb), SpO2 100 %, not currently breastfeeding.  Physical Exam:   General: patient awake, alert and cooperative    Assessment & Plan     Diagnosis:  Personal hx of colon polyps    Anticipated Surgical Procedure:  Colonoscopy    The risks, benefits, and alternatives of this procedure have been discussed with the patient or the responsible party- the patient understands and agrees to proceed.                                                                  "

## 2023-08-09 LAB
LAB AP CASE REPORT: NORMAL
PATH REPORT.FINAL DX SPEC: NORMAL
PATH REPORT.GROSS SPEC: NORMAL

## 2023-08-30 ENCOUNTER — TELEPHONE (OUTPATIENT)
Dept: GASTROENTEROLOGY | Facility: CLINIC | Age: 43
End: 2023-08-30
Payer: COMMERCIAL

## 2023-08-30 NOTE — TELEPHONE ENCOUNTER
----- Message from Lawson Krueger MD sent at 8/29/2023  2:23 PM EDT -----  The polyp(s) biopsies showed adenomatous change. This is not cancerous but is considered potentially precancerous. Follow-up colonoscopy in 7 years is advised.

## 2023-08-30 NOTE — TELEPHONE ENCOUNTER
Colonoscopy placed in  and recall for 8/8/30.    Pt reviewed results via [place]. Sent pt MyChart msg advising of results. Advised to call if any questions.

## 2023-12-06 ENCOUNTER — OFFICE VISIT (OUTPATIENT)
Dept: OBSTETRICS AND GYNECOLOGY | Facility: CLINIC | Age: 43
End: 2023-12-06
Payer: COMMERCIAL

## 2023-12-06 VITALS
SYSTOLIC BLOOD PRESSURE: 146 MMHG | WEIGHT: 169 LBS | HEIGHT: 66 IN | BODY MASS INDEX: 27.16 KG/M2 | DIASTOLIC BLOOD PRESSURE: 76 MMHG

## 2023-12-06 DIAGNOSIS — Z00.00 ANNUAL PHYSICAL EXAM: Primary | ICD-10-CM

## 2023-12-06 NOTE — PROGRESS NOTES
HPI   Aislinn Marie  is a 43 y.o. female who presents for a routine gynecologic exam.  Overall, she is feeling well.  Bowels and bladder are functioning normally.  The patient has a family history of colon cancer and therefore has colonoscopies every fifth year.  Mammogram is scheduled for later this month.  She is still using pellet hormones from the hormone center.  She is satisfied with this.    Chief Complaint   Patient presents with    Gynecologic Exam     Patient is here for a annual.       Past Medical History:   Diagnosis Date    Arthritis     Essential hypertension 4/7/2016    Hemorrhoid     Lumbar disc herniation     Neurofibroma of lower extremity     RIGHT THIGH    Ocular migraine     Varicose veins        Past Surgical History:   Procedure Laterality Date    CHOLECYSTECTOMY      Dr. Vasquez    COLONOSCOPY N/A 8/8/2023    Procedure: COLONOSCOPY TO CECUM WITH COLD POLYPECTOMY;  Surgeon: Lawson Krueger MD;  Location: [facility] ENDOSCOPY;  Service: Gastroenterology;  Laterality: N/A;  PRE:HISTORY OF COLON POLYPS POST:COLON POLYP    COLONOSCOPY W/ POLYPECTOMY N/A 10/24/2018    Procedure: COLONOSCOPY TO CECUM AND TERMINAL ILEUM WITH COLD POLYPECTOMY;  Surgeon: Lawson Krueger MD;  Location: [facility] ENDOSCOPY;  Service: Gastroenterology    ENDOSCOPY N/A 10/24/2018    Procedure: ESOPHAGOGASTRODUODENOSCOPY WITH COLD BIOPSIES;  Surgeon: Lawson Krueger MD;  Location: [facility] ENDOSCOPY;  Service: Gastroenterology    ENDOVENOUS ABLATION SAPHENOUS VEIN W/ LASER      Dr. Melia CUMMINGS TUBAL LIGATION  2013    Dr. CANELO Saini    EXCISION MASS LEG Right 10/09/2019    Procedure: EXCISION LARGE NEUROFIBROMA RIGHT THIGH;  Surgeon: Arnel Chopra MD;  Location: [facility] OR [facility];  Service: Plastics    LUMBAR DISCECTOMY FUSION INSTRUMENTATION Left 06/11/2016    Procedure: LUMBAR DISCECTOMY POSTERIOR WITH METRIX, Left L4/5, LEFT DECOMPRESSION L5/S1;  Surgeon: James Washburn MD;  Location: [facility]  MAIN OR;  Service:     MICROAMBULATORY PHLEBECTOMY      Dr. Cárdenas    NEUROFIBROMA EXCISION      WISDOM TOOTH EXTRACTION         Social History     Socioeconomic History    Marital status:    Tobacco Use    Smoking status: Never    Smokeless tobacco: Never   Vaping Use    Vaping Use: Never used   Substance and Sexual Activity    Alcohol use: Yes     Comment: wine on a rare occasion    Drug use: No    Sexual activity: Defer     Comment: essure       The following portions of the patient's history were reviewed and updated as appropriate: allergies, current medications, past family history, past medical history, past social history, past surgical history and problem list.    Review of Systems   Constitutional: Negative.    HENT: Negative.     Respiratory: Negative.     Cardiovascular: Negative.    Gastrointestinal: Negative.    Genitourinary: Negative.    Musculoskeletal: Negative.    Skin: Negative.    Allergic/Immunologic: Negative.    Psychiatric/Behavioral: Negative.               Physical Exam  Vitals and nursing note reviewed.   Constitutional:       Appearance: She is well-developed.   HENT:      Head: Normocephalic and atraumatic.   Cardiovascular:      Rate and Rhythm: Normal rate and regular rhythm.   Pulmonary:      Effort: Pulmonary effort is normal.      Breath sounds: Normal breath sounds. No wheezing or rales.   Chest:      Comments: The breasts are homogeneous.  There are no palpable lumps.  Nipple discharge and axillary adenopathy are absent.  Abdominal:      General: There is no distension.      Palpations: Abdomen is soft.      Tenderness: There is no abdominal tenderness.   Genitourinary:     Labia:         Right: No lesion.         Left: No lesion.       Vagina: Normal. No vaginal discharge.      Cervix: No cervical motion tenderness.      Uterus: Normal. Not enlarged and not tender.       Adnexa:         Right: No mass or tenderness.          Left: No mass or tenderness.     Skin:      General: Skin is warm and dry.   Neurological:      Mental Status: She is alert and oriented to person, place, and time.         Assessment    Diagnoses and all orders for this visit:    1. Annual physical exam (Primary)        Plan  Annual examination performed  Counseled regarding the importance of regular screening mammograms.  Mammogram is scheduled for later this month.  The patient is current with colon cancer screening.  She has a family history of colon cancer and has a colonoscopy every 50-year.  Hormone replacement.  The patient is using pellets from a hormone replacement center.  She is satisfied with this    Return in about 1 year (around 12/6/2024).    Social History     Tobacco Use   Smoking Status Never   Smokeless Tobacco Never

## 2024-01-04 ENCOUNTER — TELEPHONE (OUTPATIENT)
Dept: OBSTETRICS AND GYNECOLOGY | Facility: CLINIC | Age: 44
End: 2024-01-04
Payer: COMMERCIAL

## 2024-01-04 RX ORDER — NITROFURANTOIN 25; 75 MG/1; MG/1
100 CAPSULE ORAL 2 TIMES DAILY
Qty: 6 CAPSULE | Refills: 0 | Status: SHIPPED | OUTPATIENT
Start: 2024-01-04

## 2024-01-04 NOTE — TELEPHONE ENCOUNTER
Pt called asking if UTI medicine could be sent to her pharmacy on file? Pt states she has frequent UTIs.     Pharmacy- [facility] [id number] - [place], [place] - [address] [place] [address] AT  [address] - [phone] [facility] [phone] FX     Please advise, thank you

## 2024-01-04 NOTE — TELEPHONE ENCOUNTER
Please let the patient know a prescription for Macrobid has been sent to her pharmacy.  Thank you.

## 2024-01-23 ENCOUNTER — TELEPHONE (OUTPATIENT)
Dept: OBSTETRICS AND GYNECOLOGY | Facility: CLINIC | Age: 44
End: 2024-01-23
Payer: COMMERCIAL

## 2024-01-23 ENCOUNTER — CLINICAL SUPPORT (OUTPATIENT)
Dept: OBSTETRICS AND GYNECOLOGY | Facility: CLINIC | Age: 44
End: 2024-01-23
Payer: COMMERCIAL

## 2024-01-23 DIAGNOSIS — R39.9 UTI SYMPTOMS: Primary | ICD-10-CM

## 2024-01-23 LAB
BILIRUB BLD-MCNC: NEGATIVE MG/DL
GLUCOSE UR STRIP-MCNC: NEGATIVE MG/DL
KETONES UR QL: NEGATIVE
LEUKOCYTE EST, POC: NEGATIVE
NITRITE UR-MCNC: NEGATIVE MG/ML
PH UR: 6 [PH] (ref 5–8)
PROT UR STRIP-MCNC: NEGATIVE MG/DL
RBC # UR STRIP: NEGATIVE /UL
SP GR UR: 1.02 (ref 1–1.03)
UROBILINOGEN UR QL: NORMAL

## 2024-01-23 NOTE — TELEPHONE ENCOUNTER
Patient treated recently for UTI and it cleared up. On 1/21 started with another UTI. Still had the medication and took it for 2 days. Still having symptoms. Please advise. Thank You. Pt Ph [phone]

## 2024-01-23 NOTE — TELEPHONE ENCOUNTER
Please ask the patient to come in to leave a urine for culture.  It may be that we need to change her antibiotic.  Thank you.

## 2024-01-25 ENCOUNTER — TELEMEDICINE (OUTPATIENT)
Dept: FAMILY MEDICINE CLINIC | Facility: TELEHEALTH | Age: 44
End: 2024-01-25
Payer: COMMERCIAL

## 2024-01-25 DIAGNOSIS — H10.023 PINK EYE DISEASE, BILATERAL: Primary | ICD-10-CM

## 2024-01-25 LAB
BACTERIA UR CULT: NO GROWTH
BACTERIA UR CULT: NORMAL

## 2024-01-25 RX ORDER — POLYMYXIN B SULFATE AND TRIMETHOPRIM 1; 10000 MG/ML; [USP'U]/ML
SOLUTION OPHTHALMIC
Qty: 10 ML | Refills: 0 | Status: SHIPPED | OUTPATIENT
Start: 2024-01-25

## 2024-01-25 NOTE — PROGRESS NOTES
Subjective   Chief Complaint   Patient presents with    Eye Problem       Aislinn Marie is a 44 y.o. female.     History of Present Illness  Patient reports waking up today with bilateral eye swelling, itching, redness, crusting and gooey like drainage.  She continues to have weeping and watering of the eyes.  Left eye swelling worse than right.  She does not wear contact lenses.  No known pinkeye exposure.  Denies trauma or debris in the eyes.  Eye Problem   Both eyes are affected. This is a new problem. The current episode started today. The problem occurs constantly. The problem has been unchanged. There was no injury mechanism. The pain is mild. She Does not wear contacts. Associated symptoms include an eye discharge, eye redness and itching. Pertinent negatives include no blurred vision, double vision, fever, foreign body sensation, nausea, photophobia, recent URI or vomiting.        Allergies   Allergen Reactions    Mastisol [Wound Dressing Adhesive] Itching    Levofloxacin Rash    Percocet [Oxycodone-Acetaminophen] Rash       Past Medical History:   Diagnosis Date    Arthritis     Essential hypertension 4/7/2016    Hemorrhoid     Lumbar disc herniation     Neurofibroma of lower extremity     RIGHT THIGH    Ocular migraine     Varicose veins        Past Surgical History:   Procedure Laterality Date    CHOLECYSTECTOMY      Dr. Vasquez    COLONOSCOPY N/A 8/8/2023    Procedure: COLONOSCOPY TO CECUM WITH COLD POLYPECTOMY;  Surgeon: Lawson Krueger MD;  Location: [facility] ENDOSCOPY;  Service: Gastroenterology;  Laterality: N/A;  PRE:HISTORY OF COLON POLYPS POST:COLON POLYP    COLONOSCOPY W/ POLYPECTOMY N/A 10/24/2018    Procedure: COLONOSCOPY TO CECUM AND TERMINAL ILEUM WITH COLD POLYPECTOMY;  Surgeon: Lawson Krueger MD;  Location: [facility] ENDOSCOPY;  Service: Gastroenterology    ENDOSCOPY N/A 10/24/2018    Procedure: ESOPHAGOGASTRODUODENOSCOPY WITH COLD BIOPSIES;  Surgeon: Lawson Krueger MD;   Location: [facility] ENDOSCOPY;  Service: Gastroenterology    ENDOVENOUS ABLATION SAPHENOUS VEIN W/ LASER      Dr. Melia CUMMINGS TUBAL LIGATION  2013    Dr. CANELO Saini    EXCISION MASS LEG Right 10/09/2019    Procedure: EXCISION LARGE NEUROFIBROMA RIGHT THIGH;  Surgeon: Arnel Chopra MD;  Location:  [facility];  Service: Plastics    LUMBAR DISCECTOMY FUSION INSTRUMENTATION Left 06/11/2016    Procedure: LUMBAR DISCECTOMY POSTERIOR WITH METRIX, Left L4/5, LEFT DECOMPRESSION L5/S1;  Surgeon: James Washburn MD;  Location: [facility] MAIN OR;  Service:     MICROAMBULATORY PHLEBECTOMY      Dr. Cárdenas    NEUROFIBROMA EXCISION      WISDOM TOOTH EXTRACTION         Social History     Socioeconomic History    Marital status:    Tobacco Use    Smoking status: Never    Smokeless tobacco: Never   Vaping Use    Vaping Use: Never used   Substance and Sexual Activity    Alcohol use: Yes     Comment: wine on a rare occasion    Drug use: No    Sexual activity: Defer     Comment: essure       Family History   Problem Relation Age of Onset    Hypertension Mother     Diabetes type II Mother     Hypertension Maternal Grandmother     Colon cancer Maternal Grandmother     Stroke Maternal Grandfather     Breast cancer Maternal Great-Grandmother     Malig Hyperthermia Neg Hx          Current Outpatient Medications:     Chlorphen-Pseudoephed-APAP (TYLENOL ALLERGY SINUS PO), Take 1 tablet by mouth Daily As Needed., Disp: , Rfl:     fluconazole (Diflucan) 150 MG tablet, Take 1 tablet by mouth Daily., Disp: 1 tablet, Rfl: 1    gabapentin (NEURONTIN) 100 MG capsule, Take 1 capsule by mouth at bedtime for 7 days, then twice daily, Disp: 60 capsule, Rfl: 3    gabapentin (NEURONTIN) 100 MG capsule, Take 1 capsule by mouth 2 (Two) Times a Day. (Patient taking differently: Take 2 capsules by mouth Every Night.), Disp: 60 capsule, Rfl: 5    spironolactone (ALDACTONE) 50 MG tablet, Take 1 tablet by mouth Daily., Disp: 90 tablet, Rfl: 1     trimethoprim-polymyxin b (Polytrim) 20072-0.1 UNIT/ML-% ophthalmic solution, Apply 1-2 drops in the affected eye(s) every 4 hours for two days, then apply 1-2 drops in the affected eye(s) 4 times a day for five days., Disp: 10 mL, Rfl: 0    valACYclovir (VALTREX) 1000 MG tablet, Take 1 tablet by mouth Daily As Needed., Disp: , Rfl:       Review of Systems   Constitutional:  Negative for chills, diaphoresis, fatigue and fever.   HENT: Negative.     Eyes:  Positive for discharge, redness and itching. Negative for blurred vision, double vision, photophobia, pain and visual disturbance.   Respiratory: Negative.     Gastrointestinal:  Negative for nausea and vomiting.   Neurological:  Negative for headache.        There were no vitals filed for this visit.    Objective   Physical Exam  Constitutional:       General: She is not in acute distress.     Appearance: Normal appearance. She is not ill-appearing, toxic-appearing or diaphoretic.   HENT:      Head: Normocephalic.      Nose: Nose normal.      Mouth/Throat:      Lips: Pink.      Mouth: Mucous membranes are moist.   Eyes:      Conjunctiva/sclera:      Right eye: Right conjunctiva is injected.      Left eye: Left conjunctiva is injected.      Comments: Bilateral swelling of upper and lower lids    Pulmonary:      Effort: Pulmonary effort is normal.   Neurological:      Mental Status: She is alert and oriented to person, place, and time.          Procedures     Assessment & Plan   Diagnoses and all orders for this visit:    1. Pink eye disease, bilateral (Primary)  -     trimethoprim-polymyxin b (Polytrim) 19534-6.1 UNIT/ML-% ophthalmic solution; Apply 1-2 drops in the affected eye(s) every 4 hours for two days, then apply 1-2 drops in the affected eye(s) 4 times a day for five days.  Dispense: 10 mL; Refill: 0      You may use cool compresses as needed for comfort.  Discard any eye makeup such as mascara or eyeliner if used during symptoms or just prior to  symptoms.  Discard contact lenses if used during this time and wear glasses until treatment is complete.   Avoid rubbing eyes and use frequent hand hygiene.     If symptoms worsen or do not improve follow up with your PCP or visit your nearest Urgent Care Center or ER.      PLAN: Discussed dosing, side effects, recommended other symptomatic care.  Patient should follow up with primary care provider, Urgent Care or ER if symptoms worsen, fail to resolve or other symptoms need attention. Patient/family agree to the above.         ETHEL Barnes     The use of a video visit has been reviewed with the patient and verbal informed consent has been obtained. Myself and Aislinn Marie participated in this visit. The patient is located at [address] [place] [address]. I am located in [place], [place]. Mychart and Zoom were utilized.        This visit was performed via Telehealth.  This patient has been instructed to follow-up with their primary care provider if their symptoms worsen or the treatment provided does not resolve their illness.

## 2024-01-25 NOTE — PATIENT INSTRUCTIONS
You may use cool compresses as needed for comfort.  Discard any eye makeup such as mascara or eyeliner if used during symptoms or just prior to symptoms.  Discard contact lenses if used during this time and wear glasses until treatment is complete.   Avoid rubbing eyes and use frequent hand hygiene.     If symptoms worsen or do not improve follow up with your PCP or visit your nearest Urgent Care Center or ER.

## 2024-04-02 RX ORDER — CYCLOBENZAPRINE HCL 10 MG
5 TABLET ORAL 3 TIMES DAILY PRN
Qty: 60 TABLET | Refills: 5 | Status: SHIPPED | OUTPATIENT
Start: 2024-04-02

## 2024-04-16 ENCOUNTER — TELEMEDICINE (OUTPATIENT)
Dept: FAMILY MEDICINE CLINIC | Facility: TELEHEALTH | Age: 44
End: 2024-04-16
Payer: COMMERCIAL

## 2024-04-16 DIAGNOSIS — R39.89 SUSPECTED UTI: Primary | ICD-10-CM

## 2024-04-16 PROCEDURE — 99213 OFFICE O/P EST LOW 20 MIN: CPT | Performed by: NURSE PRACTITIONER

## 2024-04-16 RX ORDER — PHENAZOPYRIDINE HYDROCHLORIDE 200 MG/1
200 TABLET, FILM COATED ORAL 3 TIMES DAILY PRN
Qty: 6 TABLET | Refills: 0 | Status: SHIPPED | OUTPATIENT
Start: 2024-04-16 | End: 2024-04-18

## 2024-04-16 RX ORDER — NITROFURANTOIN 25; 75 MG/1; MG/1
100 CAPSULE ORAL 2 TIMES DAILY
Qty: 14 CAPSULE | Refills: 0 | Status: SHIPPED | OUTPATIENT
Start: 2024-04-16 | End: 2024-04-23

## 2024-04-16 NOTE — PROGRESS NOTES
You have chosen to receive care through a telehealth visit.  Do you consent to use a video/audio connection for your medical care today? Yes     CHIEF COMPLAINT  Chief Complaint   Patient presents with    Urinary Tract Infection         HPI  Aislinn Marie is a 44 y.o. female  presents with complaint of burning on urination that started this morning.    Review of Systems   Genitourinary:  Positive for dysuria.   All other systems reviewed and are negative.      Past Medical History:   Diagnosis Date    Arthritis     Essential hypertension 4/7/2016    Hemorrhoid     Lumbar disc herniation     Neurofibroma of lower extremity     RIGHT THIGH    Ocular migraine     Varicose veins        Family History   Problem Relation Age of Onset    Hypertension Mother     Diabetes type II Mother     Hypertension Maternal Grandmother     Colon cancer Maternal Grandmother     Stroke Maternal Grandfather     Breast cancer Maternal Great-Grandmother     Malig Hyperthermia Neg Hx        Social History     Socioeconomic History    Marital status:    Tobacco Use    Smoking status: Never    Smokeless tobacco: Never   Vaping Use    Vaping status: Never Used   Substance and Sexual Activity    Alcohol use: Yes     Comment: wine on a rare occasion    Drug use: No    Sexual activity: Defer     Comment: essure       Aislinn Marie  reports that she has never smoked. She has never used smokeless tobacco.       There were no vitals taken for this visit.    PHYSICAL EXAM  Physical Exam   Constitutional: She appears well-developed and well-nourished.   HENT:   Head: Normocephalic.   Eyes: Pupils are equal, round, and reactive to light.   Pulmonary/Chest: Effort normal.   Abdominal:   Self guided abd exam did not reveal any tenderness on palpation   Musculoskeletal: Normal range of motion.   Neurological: She is alert.   Psychiatric: She has a normal mood and affect.       Results for orders placed or performed in visit on 01/23/24   Urine  Culture - Urine, Urine, Clean Catch    Specimen: Urine, Clean Catch    Urine  Release to bhavana   Result Value Ref Range    Urine Culture Final report     Result 1 No growth    POC Urinalysis Dipstick    Specimen: Urine   Result Value Ref Range    Glucose, UA Negative Negative mg/dL    Bilirubin Negative Negative    Ketones, UA Negative Negative    Specific Gravity  1.020 1.005 - 1.030    Blood, UA Negative Negative    pH, Urine 6.0 5.0 - 8.0    Protein, POC Negative Negative mg/dL    Urobilinogen, UA 0.2 E.U./dL Normal, 0.2 E.U./dL    Leukocytes Negative Negative    Nitrite, UA Negative Negative       Diagnoses and all orders for this visit:    1. Suspected UTI (Primary)  -     nitrofurantoin, macrocrystal-monohydrate, (Macrobid) 100 MG capsule; Take 1 capsule by mouth 2 (Two) Times a Day for 7 days.  Dispense: 14 capsule; Refill: 0  -     phenazopyridine (Pyridium) 200 MG tablet; Take 1 tablet by mouth 3 (Three) Times a Day As Needed for Bladder Spasms for up to 2 days.  Dispense: 6 tablet; Refill: 0          FOLLOW-UP  As discussed during visit with PCP/[facility] if no improvement or Urgent Care/Emergency Department if worsening of symptoms    Patient verbalizes understanding of medication dosage, comfort measures, instructions for treatment and follow-up.    ETHEL John  04/16/2024  05:20 EDT    The use of a video visit has been reviewed with the patient and verbal informed consent has been obtained. Myself and Aislinn Marie participated in this visit. The patient is located in [address] [place] [address].    I am located in [place], [place]. Mychart and Twilio were utilized. I spent 10 minutes in the patient's chart for this visit.      Note Disclaimer: At [facility], we believe that sharing information builds trust and better   relationships. You are receiving this note because you recently visited [facility]. It is possible you   will see health information before a provider  has talked with you about it. This kind of information can   be easy to misunderstand. To help you fully understand what it means for your health, we urge you to   discuss this note with your provider.

## 2024-07-10 ENCOUNTER — LAB (OUTPATIENT)
Facility: HOSPITAL | Age: 44
End: 2024-07-10
Payer: COMMERCIAL

## 2024-07-10 ENCOUNTER — OFFICE VISIT (OUTPATIENT)
Dept: ENDOCRINOLOGY | Age: 44
End: 2024-07-10
Payer: COMMERCIAL

## 2024-07-10 VITALS
BODY MASS INDEX: 28.03 KG/M2 | DIASTOLIC BLOOD PRESSURE: 88 MMHG | SYSTOLIC BLOOD PRESSURE: 124 MMHG | OXYGEN SATURATION: 100 % | HEIGHT: 66 IN | WEIGHT: 174.4 LBS | TEMPERATURE: 97.8 F | HEART RATE: 80 BPM

## 2024-07-10 DIAGNOSIS — R79.89 ABNORMAL THYROID BLOOD TEST: Primary | ICD-10-CM

## 2024-07-10 LAB
T4 FREE SERPL-MCNC: 1.13 NG/DL (ref 0.92–1.68)
TSH SERPL DL<=0.05 MIU/L-ACNC: 1.36 UIU/ML (ref 0.27–4.2)

## 2024-07-10 PROCEDURE — 99204 OFFICE O/P NEW MOD 45 MIN: CPT | Performed by: STUDENT IN AN ORGANIZED HEALTH CARE EDUCATION/TRAINING PROGRAM

## 2024-07-10 PROCEDURE — 84443 ASSAY THYROID STIM HORMONE: CPT | Performed by: STUDENT IN AN ORGANIZED HEALTH CARE EDUCATION/TRAINING PROGRAM

## 2024-07-10 PROCEDURE — 36415 COLL VENOUS BLD VENIPUNCTURE: CPT | Performed by: STUDENT IN AN ORGANIZED HEALTH CARE EDUCATION/TRAINING PROGRAM

## 2024-07-10 PROCEDURE — 86376 MICROSOMAL ANTIBODY EACH: CPT | Performed by: STUDENT IN AN ORGANIZED HEALTH CARE EDUCATION/TRAINING PROGRAM

## 2024-07-10 PROCEDURE — 84439 ASSAY OF FREE THYROXINE: CPT | Performed by: STUDENT IN AN ORGANIZED HEALTH CARE EDUCATION/TRAINING PROGRAM

## 2024-07-10 PROCEDURE — 86800 THYROGLOBULIN ANTIBODY: CPT | Performed by: STUDENT IN AN ORGANIZED HEALTH CARE EDUCATION/TRAINING PROGRAM

## 2024-07-10 NOTE — ASSESSMENT & PLAN NOTE
Recheck TFT today  Check TPO anticardiolipin antibody  Further testing and management based on results   99

## 2024-07-10 NOTE — PROGRESS NOTES
"Chief Complaint/ Reason for consult      disorder of the thyroid      History of Present Illness      Referred for further management of abnormal thyroid blood test.  Noted to have abnormal TFT, per patient one of her levels was  60 or 70  Never been diagnosed with thyroid disease  Her maternal grandmother had thyroid disease  Complains of constipation and cold intolerance  Her periods are regular but short          Objective   Vital Signs:  /88   Pulse 80   Temp 97.8 °F (36.6 °C) (Temporal)   Ht 167.6 cm (65.98\")   Wt 79.1 kg (174 lb 6.4 oz)   SpO2 100%   BMI 28.16 kg/m²   Estimated body mass index is 28.16 kg/m² as calculated from the following:    Height as of this encounter: 167.6 cm (65.98\").    Weight as of this encounter: 79.1 kg (174 lb 6.4 oz).               Physical Exam  Constitutional:       Appearance: Normal appearance.   HENT:      Head: Normocephalic and atraumatic.   Eyes:      Extraocular Movements: Extraocular movements intact.   Cardiovascular:      Rate and Rhythm: Normal rate and regular rhythm.   Pulmonary:      Effort: Pulmonary effort is normal.      Breath sounds: Normal breath sounds. No wheezing.   Abdominal:      Palpations: Abdomen is soft.      Tenderness: There is no abdominal tenderness.   Musculoskeletal:         General: No swelling. Normal range of motion.      Cervical back: Neck supple. No tenderness.   Neurological:      Mental Status: She is alert and oriented to person, place, and time.   Psychiatric:         Mood and Affect: Mood normal.        Result Review :  The following data was reviewed by: Mahrokh Nokhbehzaeim, MD on 07/10/2024:          No results found for: \"FREET4\"                Assessment and Plan   Diagnoses and all orders for this visit:    1. Abnormal thyroid blood test (Primary)  Assessment & Plan:  Recheck TFT today  Check TPO anticardiolipin antibody  Further testing and management based on results    Orders:  -     TSH  -     T4, Free  -     " Thyroid Peroxidase Antibody  -     Thyroglobulin Antibody             Follow Up   Return in about 6 months (around 1/10/2025).  Patient was given instructions and counseling regarding her condition or for health maintenance advice. Please see specific information pulled into the AVS if appropriate.

## 2024-07-11 ENCOUNTER — TELEPHONE (OUTPATIENT)
Dept: ENDOCRINOLOGY | Age: 44
End: 2024-07-11
Payer: COMMERCIAL

## 2024-07-11 LAB
THYROGLOB AB SERPL-ACNC: 1.3 IU/ML (ref 0–0.9)
THYROPEROXIDASE AB SERPL-ACNC: 156 IU/ML (ref 0–34)

## 2024-07-11 NOTE — TELEPHONE ENCOUNTER
Called to discuss the result, no answer left a voicemail.  Will send her a [employer] message

## 2024-07-12 ENCOUNTER — PATIENT ROUNDING (BHMG ONLY) (OUTPATIENT)
Dept: ENDOCRINOLOGY | Age: 44
End: 2024-07-12
Payer: COMMERCIAL

## 2024-07-15 RX ORDER — GABAPENTIN 100 MG/1
200 CAPSULE ORAL NIGHTLY
Qty: 60 CAPSULE | Refills: 5 | Status: SHIPPED | OUTPATIENT
Start: 2024-07-15

## 2024-08-05 ENCOUNTER — TELEMEDICINE (OUTPATIENT)
Dept: FAMILY MEDICINE CLINIC | Facility: TELEHEALTH | Age: 44
End: 2024-08-05
Payer: COMMERCIAL

## 2024-08-05 DIAGNOSIS — N76.0 ACUTE VAGINITIS: Primary | ICD-10-CM

## 2024-08-05 RX ORDER — FLUCONAZOLE 150 MG/1
TABLET ORAL
Qty: 2 TABLET | Refills: 0 | Status: SHIPPED | OUTPATIENT
Start: 2024-08-05

## 2024-08-05 NOTE — PROGRESS NOTES
Subjective   Chief Complaint   Patient presents with    Vaginal Itching       Aislinn Marie is a 44 y.o. female.     History of Present Illness  Pt reports vaginal pain, itching and white discharge for the past 1-2 days. Symptoms feel like yeast infections she has had in the past.   Vaginal Itching  The patient's primary symptoms include genital itching and vaginal discharge. The patient's pertinent negatives include no genital lesions, genital odor, genital rash, missed menses, pelvic pain or vaginal bleeding. This is a new problem. Episode onset: 1-2 days. The problem occurs constantly. The problem has been unchanged. Pertinent negatives include no abdominal pain, anorexia, back pain, chills, constipation, diarrhea, discolored urine, dysuria, fever, flank pain, frequency, headaches, hematuria, joint pain, joint swelling, nausea, painful intercourse, rash, sore throat, urgency or vomiting. The vaginal discharge was white and thick. Nothing aggravates the symptoms. She has tried nothing for the symptoms. The maximum temperature recorded prior to her arrival was no fever.        Allergies   Allergen Reactions    Mastisol [Wound Dressing Adhesive] Itching    Levofloxacin Rash    Percocet [Oxycodone-Acetaminophen] Rash       Past Medical History:   Diagnosis Date    Arthritis     Essential hypertension 4/7/2016    Hemorrhoid     Lumbar disc herniation     Neurofibroma of lower extremity     RIGHT THIGH    Ocular migraine     Varicose veins        Past Surgical History:   Procedure Laterality Date    CHOLECYSTECTOMY      Dr. Vasquez    COLONOSCOPY N/A 8/8/2023    Procedure: COLONOSCOPY TO CECUM WITH COLD POLYPECTOMY;  Surgeon: Lawson Krueger MD;  Location: [facility] ENDOSCOPY;  Service: Gastroenterology;  Laterality: N/A;  PRE:HISTORY OF COLON POLYPS POST:COLON POLYP    COLONOSCOPY W/ POLYPECTOMY N/A 10/24/2018    Procedure: COLONOSCOPY TO CECUM AND TERMINAL ILEUM WITH COLD POLYPECTOMY;  Surgeon: Lawson Krueger  MD Jorge;  Location: [facility] ENDOSCOPY;  Service: Gastroenterology    ENDOSCOPY N/A 10/24/2018    Procedure: ESOPHAGOGASTRODUODENOSCOPY WITH COLD BIOPSIES;  Surgeon: Lawson Krueger MD;  Location: [facility] ENDOSCOPY;  Service: Gastroenterology    ENDOVENOUS ABLATION SAPHENOUS VEIN W/ LASER      Dr. Melia CUMMINGS TUBAL LIGATION  2013    Dr. CANELO Saini    EXCISION MASS LEG Right 10/09/2019    Procedure: EXCISION LARGE NEUROFIBROMA RIGHT THIGH;  Surgeon: Arnel Chopra MD;  [facility];  Service: Plastics    LUMBAR DISCECTOMY FUSION INSTRUMENTATION Left 06/11/2016    Procedure: LUMBAR DISCECTOMY POSTERIOR WITH METRIX, Left L4/5, LEFT DECOMPRESSION L5/S1;  Surgeon: James Washburn MD;  Location: [facility] MAIN OR;  Service:     MICROAMBULATORY PHLEBECTOMY      Dr. Cárdenas    NEUROFIBROMA EXCISION      WISDOM TOOTH EXTRACTION         Social History     Socioeconomic History    Marital status:    Tobacco Use    Smoking status: Never    Smokeless tobacco: Never   Vaping Use    Vaping status: Never Used   Substance and Sexual Activity    Alcohol use: Yes     Comment: wine on a rare occasion    Drug use: No    Sexual activity: Defer     Comment: chandrika       Family History   Problem Relation Age of Onset    Hypertension Mother     Diabetes type II Mother     Hypertension Maternal Grandmother     Colon cancer Maternal Grandmother     Stroke Maternal Grandfather     Breast cancer Maternal Great-Grandmother     Malig Hyperthermia Neg Hx          Current Outpatient Medications:     Chlorphen-Pseudoephed-APAP (TYLENOL ALLERGY SINUS PO), Take 1 tablet by mouth Daily As Needed., Disp: , Rfl:     fluconazole (Diflucan) 150 MG tablet, Take 1 tablet by mouth now and repeat in 3 days., Disp: 2 tablet, Rfl: 0    gabapentin (NEURONTIN) 100 MG capsule, Take 2 capsules by mouth Every Night., Disp: 60 capsule, Rfl: 5    TESTOSTERONE NA, into the nostril(s) as directed by provider. Pellets every 4 months,  Disp: , Rfl:     Unable to find, 1 each 1 (One) Time. Med Name: SEMAGLUTIDE/CYANOCOBALAMIN) 1 x week, Disp: , Rfl:       Review of Systems   Constitutional:  Negative for chills, diaphoresis, fatigue and fever.   HENT:  Negative for sore throat.    Gastrointestinal:  Negative for abdominal pain, anorexia, constipation, diarrhea, nausea and vomiting.   Genitourinary:  Positive for vaginal discharge and vaginal pain. Negative for dysuria, flank pain, frequency, genital sores, hematuria, missed menses, pelvic pain and urgency.   Musculoskeletal:  Negative for back pain and joint pain.   Skin:  Negative for rash.        There were no vitals filed for this visit.    Objective   Physical Exam  Constitutional:       General: She is not in acute distress.     Appearance: Normal appearance. She is not ill-appearing, toxic-appearing or diaphoretic.   HENT:      Head: Normocephalic.      Mouth/Throat:      Lips: Pink.      Mouth: Mucous membranes are moist.   Pulmonary:      Effort: Pulmonary effort is normal.   Abdominal:      Tenderness: There is no abdominal tenderness (per pt).   Neurological:      Mental Status: She is alert and oriented to person, place, and time.          Procedures     Assessment & Plan   Diagnoses and all orders for this visit:    1. Acute vaginitis (Primary)  -     fluconazole (Diflucan) 150 MG tablet; Take 1 tablet by mouth now and repeat in 3 days.  Dispense: 2 tablet; Refill: 0            PLAN: Discussed dosing, side effects, recommended other symptomatic care.  Patient should follow up with primary care provider, Urgent Care or ER if symptoms worsen, fail to resolve or other symptoms need attention. Patient/family agree to the above.         ETHEL Barnes     The use of a video visit has been reviewed with the patient and verbal informed consent has been obtained. Myself and Aislinn Marie participated in this visit. The patient is located at [address] [place] [address].  I am located in [place], [place]. [employer] and [employer] were utilized.        This visit was performed via Telehealth.  This patient has been instructed to follow-up with their primary care provider if their symptoms worsen or the treatment provided does not resolve their illness.

## 2024-08-30 ENCOUNTER — DOCUMENTATION (OUTPATIENT)
Dept: NEUROSURGERY | Facility: CLINIC | Age: 44
End: 2024-08-30
Payer: COMMERCIAL

## 2024-08-30 ENCOUNTER — TELEPHONE (OUTPATIENT)
Dept: NEUROSURGERY | Facility: CLINIC | Age: 44
End: 2024-08-30
Payer: COMMERCIAL

## 2024-08-30 DIAGNOSIS — M51.36 DDD (DEGENERATIVE DISC DISEASE), LUMBAR: ICD-10-CM

## 2024-08-30 DIAGNOSIS — M51.26 HERNIATED LUMBAR INTERVERTEBRAL DISC: ICD-10-CM

## 2024-08-30 DIAGNOSIS — M54.16 CHRONIC LEFT-SIDED LUMBAR RADICULOPATHY: ICD-10-CM

## 2024-08-30 DIAGNOSIS — Z98.890 HISTORY OF LUMBAR SURGERY: Primary | ICD-10-CM

## 2024-08-30 RX ORDER — GABAPENTIN 300 MG/1
300 CAPSULE ORAL
Qty: 30 CAPSULE | Refills: 5 | Status: SHIPPED | OUTPATIENT
Start: 2024-08-30

## 2024-08-30 NOTE — PROGRESS NOTES
The patient is well-known to me.  8 years ago she underwent a left L4-5 microdiscectomy and a left L5-S1 microdecompression.  She did well but gradually over the last few years she has been having recurrent pain in the left buttock and leg and more recently even into the right.  Some increasing low back pain as well.  We have her on gabapentin at 200 mg nightly.  I suspect there is a recurrence particularly L4-L5.  Will need to get a new MRI with and without contrast and flexion-extension x-rays and will increase the gabapentin to 300 mg nightly.

## 2024-08-30 NOTE — TELEPHONE ENCOUNTER
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 Could you work on getting that done. I put a note in the chart about her symptoms.

## 2024-09-06 DIAGNOSIS — M51.16 HERNIATION OF LUMBAR INTERVERTEBRAL DISC WITH RADICULOPATHY: Primary | ICD-10-CM

## 2024-09-13 ENCOUNTER — HOSPITAL ENCOUNTER (OUTPATIENT)
Dept: GENERAL RADIOLOGY | Facility: HOSPITAL | Age: 44
Discharge: HOME | End: 2024-09-13
Payer: COMMERCIAL

## 2024-09-13 ENCOUNTER — HOSPITAL ENCOUNTER (OUTPATIENT)
Dept: PAIN MEDICINE | Facility: HOSPITAL | Age: 44
Discharge: HOME | End: 2024-09-13
Payer: COMMERCIAL

## 2024-09-13 ENCOUNTER — ANESTHESIA EVENT (OUTPATIENT)
Dept: PAIN MEDICINE | Facility: HOSPITAL | Age: 44
End: 2024-09-13
Payer: COMMERCIAL

## 2024-09-13 ENCOUNTER — ANESTHESIA (OUTPATIENT)
Dept: PAIN MEDICINE | Facility: HOSPITAL | Age: 44
End: 2024-09-13
Payer: COMMERCIAL

## 2024-09-13 VITALS
DIASTOLIC BLOOD PRESSURE: 109 MMHG | SYSTOLIC BLOOD PRESSURE: 147 MMHG | RESPIRATION RATE: 16 BRPM | BODY MASS INDEX: 25.83 KG/M2 | WEIGHT: 155 LBS | TEMPERATURE: 98 F | OXYGEN SATURATION: 100 % | HEART RATE: 70 BPM | HEIGHT: 65 IN

## 2024-09-13 DIAGNOSIS — R52 PAIN: ICD-10-CM

## 2024-09-13 DIAGNOSIS — M54.16 LUMBAR RADICULOPATHY: Primary | ICD-10-CM

## 2024-09-13 PROCEDURE — 25010000002 DEXAMETHASONE SODIUM PHOSPHATE 10 MG/ML SOLUTION: Performed by: ANESTHESIOLOGY

## 2024-09-13 PROCEDURE — 25510000001 IOPAMIDOL 41 % SOLUTION: Performed by: ANESTHESIOLOGY

## 2024-09-13 PROCEDURE — 25010000002 BUPIVACAINE (PF) 0.25 % SOLUTION: Performed by: ANESTHESIOLOGY

## 2024-09-13 PROCEDURE — 77003 FLUOROGUIDE FOR SPINE INJECT: CPT

## 2024-09-13 PROCEDURE — 25010000002 DEXAMETHASONE PER 1 MG: Performed by: ANESTHESIOLOGY

## 2024-09-13 RX ORDER — FLUTICASONE PROPIONATE 50 MCG
2 SPRAY, SUSPENSION (ML) NASAL DAILY
COMMUNITY

## 2024-09-13 RX ORDER — LIDOCAINE HYDROCHLORIDE 10 MG/ML
1 INJECTION, SOLUTION INFILTRATION; PERINEURAL ONCE
Status: DISCONTINUED | OUTPATIENT
Start: 2024-09-13 | End: 2024-09-14 | Stop reason: HOSPADM

## 2024-09-13 RX ORDER — MIDAZOLAM HYDROCHLORIDE 1 MG/ML
1 INJECTION INTRAMUSCULAR; INTRAVENOUS ONCE AS NEEDED
Status: DISCONTINUED | OUTPATIENT
Start: 2024-09-13 | End: 2024-09-14 | Stop reason: HOSPADM

## 2024-09-13 RX ORDER — IOPAMIDOL 408 MG/ML
10 INJECTION, SOLUTION INTRATHECAL
Status: COMPLETED | OUTPATIENT
Start: 2024-09-13 | End: 2024-09-13

## 2024-09-13 RX ORDER — DEXAMETHASONE SODIUM PHOSPHATE 4 MG/ML
INJECTION, SOLUTION INTRA-ARTICULAR; INTRALESIONAL; INTRAMUSCULAR; INTRAVENOUS; SOFT TISSUE
Status: COMPLETED | OUTPATIENT
Start: 2024-09-13 | End: 2024-09-13

## 2024-09-13 RX ORDER — BUPIVACAINE HYDROCHLORIDE 2.5 MG/ML
10 INJECTION, SOLUTION EPIDURAL; INFILTRATION; INTRACAUDAL ONCE
Status: COMPLETED | OUTPATIENT
Start: 2024-09-13 | End: 2024-09-13

## 2024-09-13 RX ORDER — FENTANYL CITRATE 50 UG/ML
50 INJECTION, SOLUTION INTRAMUSCULAR; INTRAVENOUS ONCE
Status: DISCONTINUED | OUTPATIENT
Start: 2024-09-13 | End: 2024-09-14 | Stop reason: HOSPADM

## 2024-09-13 RX ORDER — DEXAMETHASONE SODIUM PHOSPHATE 10 MG/ML
10 INJECTION, SOLUTION INTRAMUSCULAR; INTRAVENOUS ONCE
Status: COMPLETED | OUTPATIENT
Start: 2024-09-13 | End: 2024-09-13

## 2024-09-13 RX ADMIN — DEXAMETHASONE SODIUM PHOSPHATE 10 MG: 10 INJECTION INTRAMUSCULAR; INTRAVENOUS at 14:38

## 2024-09-13 RX ADMIN — DEXAMETHASONE SODIUM PHOSPHATE 10 MG: 4 INJECTION, SOLUTION INTRA-ARTICULAR; INTRALESIONAL; INTRAMUSCULAR; INTRAVENOUS; SOFT TISSUE at 14:49

## 2024-09-13 RX ADMIN — IOPAMIDOL 10 ML: 408 INJECTION, SOLUTION INTRATHECAL at 14:38

## 2024-09-13 RX ADMIN — BUPIVACAINE HYDROCHLORIDE 10 ML: 2.5 INJECTION, SOLUTION EPIDURAL; INFILTRATION; INTRACAUDAL; PERINEURAL at 14:38

## 2024-09-13 NOTE — DISCHARGE INSTRUCTIONS

## 2024-09-13 NOTE — ANESTHESIA PROCEDURE NOTES
Left lumbar 4 lumbar 5 transforaminal epidural steroid injection    Pre-sedation assessment completed: 9/13/2024 2:36 PM    Patient reassessed immediately prior to procedure    Patient location during procedure: pain clinic  Start Time: 9/13/2024 2:38 PM  Stop Time: 9/13/2024 2:49 PM  Indication:at surgeon's request and procedure for pain  Performed By  Anesthesiologist: Gregorio Parks MD  Preanesthetic Checklist  Completed: patient identified, risks and benefits discussed, surgical consent, monitors and equipment checked, pre-op evaluation and timeout performed  Additional Notes  Post-Op Diagnosis Codes:     * Intervertebral disc disorder with radiculopathy of lumbar region [M51.16]    Prep:  Pt Position:prone  Sterile Tech:sterile barrier, mask and gloves  Prep:chlorhexidine gluconate and isopropyl alcohol  Monitoring:blood pressure monitoring, continuous pulse oximetry and EKG  Procedure:Sedation: no     Approach:left paramedian (Transforaminal)  Guidance: fluoroscopy  Location:lumbar  Level:4-5  Needle Type:Tuohy  Needle Gauge:20 G  Aspiration:negative  Test Dose:negative  Medications:  Comments:Left L4-5 transforaminal epidural steroid injections performed under fluoroscopic guidance.  I did down from L1 to the L4 level to confirm placement.  Utilizing AP and paramedian imaging I initially placed a needle towards the superior aspect of the superior articular process.  Then under lateral fluoroscopic imaging slowly advanced the needle until it was in the neuroforamen.  There was a mild paresthesia initially.  I then returned to the AP imaging and injected 2 mL of contrast which showed spread mostly into the neuroforamen.  I then injected 1 mL of 0.25% bupivacaine and 10 mg of dexamethasone.  She had paresthesia into her left hip with this.  It did begin to pedrito upon cessation.  She tolerated procedure well.  Post Assessment:  Pt Tolerance:patient tolerated the procedure well with no apparent  complications  Complications:no

## 2024-09-13 NOTE — H&P
[facility]    History and Physical    Patient Name: Aislinn Marie  :  1980  MRN:  [id number]  Date of Admission: 2024    Subjective     Patient is a 44 y.o. female presents with chief complaint of acute on chronic low back and leg: left pain.  Onset of symptoms was gradual starting several weeks ago.  Symptoms are associated/aggravated by nothing in particular. Symptoms improve with nothing    The following portions of the patients history were reviewed and updated as appropriate: current medications, allergies, past medical history, past surgical history, past family history, past social history, and problem list  She had a previous [employer] surgery on her lumbar spine.  She now has recurrent pain in her back with radicular symptoms down the left leg.  He had a recent MRI that I am not able to open up at this time but she has a recurrent herniation at L4-5 on the left as well as a small disc herniation at L5-S1 per her report.  She is a surgical assistant with neurosurgery.        Dr. Washburn has requested a left transforaminal L4 5 injection      Objective     Past Medical History:   Past Medical History:   Diagnosis Date   • Arthritis    • Essential hypertension 2016   • Hemorrhoid    • Low back pain    • Lumbar disc herniation    • Neurofibroma of lower extremity     RIGHT THIGH   • Ocular migraine    • Varicose veins      Past Surgical History:   Past Surgical History:   Procedure Laterality Date   • CHOLECYSTECTOMY      Dr. Vasquez   • COLONOSCOPY N/A 2023    Procedure: COLONOSCOPY TO CECUM WITH COLD POLYPECTOMY;  Surgeon: Lawson Krueger MD;  Location: [facility] ENDOSCOPY;  Service: Gastroenterology;  Laterality: N/A;  PRE:HISTORY OF COLON POLYPS POST:COLON POLYP   • COLONOSCOPY W/ POLYPECTOMY N/A 10/24/2018    Procedure: COLONOSCOPY TO CECUM AND TERMINAL ILEUM WITH COLD POLYPECTOMY;  Surgeon: Lawson Krueger MD;  Location: [facility] ENDOSCOPY;  Service:  Gastroenterology   • ENDOSCOPY N/A 10/24/2018    Procedure: ESOPHAGOGASTRODUODENOSCOPY WITH COLD BIOPSIES;  Surgeon: Lawson Krueger MD;  Location: [facility] ENDOSCOPY;  Service: Gastroenterology   • ENDOVENOUS ABLATION SAPHENOUS VEIN W/ LASER      Dr. Cárdenas   • ESSURE TUBAL LIGATION  2013    Dr. CANELO Saini   • EXCISION MASS LEG Right 10/09/2019    Procedure: EXCISION LARGE NEUROFIBROMA RIGHT THIGH;  Surgeon: Arnel Chopra MD;  Location: [facility] OR OSC;  Service: Plastics   • LUMBAR DISCECTOMY FUSION INSTRUMENTATION Left 06/11/2016    Procedure: LUMBAR DISCECTOMY POSTERIOR WITH METRIX, Left L4/5, LEFT DECOMPRESSION L5/S1;  Surgeon: James Washburn MD;  Location: [facility] MAIN OR;  Service:    • MICROAMBULATORY PHLEBECTOMY      Dr. Cárdenas   • NEUROFIBROMA EXCISION     • WISDOM TOOTH EXTRACTION       Family History:   Family History   Problem Relation Age of Onset   • Hypertension Mother    • Diabetes type II Mother    • Hypertension Maternal Grandmother    • Colon cancer Maternal Grandmother    • Stroke Maternal Grandfather    • Breast cancer Maternal Great-Grandmother    • Malig Hyperthermia Neg Hx      Social History:   Social History     Socioeconomic History   • Marital status:    Tobacco Use   • Smoking status: Never   • Smokeless tobacco: Never   Vaping Use   • Vaping status: Never Used   Substance and Sexual Activity   • Alcohol use: Yes     Comment: wine on a rare occasion   • Drug use: No   • Sexual activity: Defer     Comment: essure       Vital Signs Range for the last 24 hours  Temperature:     Temp Source:     BP:     Pulse:     Respirations:     SPO2:     O2 Amount (l/min):     O2 Devices     Weight:           --------------------------------------------------------------------------------    Current Outpatient Medications   Medication Sig Dispense Refill   • Chlorphen-Pseudoephed-APAP (TYLENOL ALLERGY SINUS PO) Take 1 tablet by mouth Daily As Needed.     • fluconazole (Diflucan) 150  MG tablet Take 1 tablet by mouth now and repeat in 3 days. 2 tablet 0   • fluticasone (FLONASE) 50 MCG/ACT nasal spray 2 sprays into the nostril(s) as directed by provider Daily.     • gabapentin (NEURONTIN) 300 MG capsule Take 1 capsule by mouth every night at bedtime. 30 capsule 5   • Semaglutide-Weight Management (Wegovy) 0.25 MG/0.5ML solution auto-injector Inject 0.5 mL under the skin into the appropriate area as directed 1 (One) Time Per Week. 2 mL 6   • TESTOSTERONE NA into the nostril(s) as directed by provider. Pellets every 4 months     • Unable to find 1 each 1 (One) Time. Med Name: SEMAGLUTIDE/CYANOCOBALAMIN) 1 x week     • valACYclovir (VALTREX) 1000 MG tablet Take 2 tablets by mouth at onset. Then take 1 tablet daily As Needed. 21 tablet 6   • valACYclovir (VALTREX) 1000 MG tablet Take 2 tablets by mouth at onset, then 1 tablet daily as needed. 21 tablet 6     Current Facility-Administered Medications   Medication Dose Route Frequency Provider Last Rate Last Admin   • bupivacaine (PF) (MARCAINE) 0.25 % injection 10 mL  10 mL Infiltration Once RenderGregorio MD       • dexAMETHasone sodium phosphate injection 10 mg  10 mg Epidural Once RenderGregorio MD       • fentaNYL citrate (PF) (SUBLIMAZE) injection 50 mcg  50 mcg Intravenous Once RenderGregorio MD       • iopamidol (ISOVUE-M 200) injection 41%  10 mL Epidural Once in imaging Gregorio Parks MD       • lidocaine (XYLOCAINE) 1 % injection 1 mL  1 mL Intradermal Once Gregorio Parks MD       • midazolam (VERSED) injection 1 mg  1 mg Intravenous Once PRN Gregorio Parks MD           --------------------------------------------------------------------------------  Assessment & Plan      Anesthesia Evaluation           Pain impairs ability to perform ADLs: Ambulation, Exercise/Activity and Working       Airway   Mallampati: II  Dental      Pulmonary    (-) wheezes  Cardiovascular     Rhythm: regular    (+) hypertension, murmur       Neuro/Psych  (+) numbness  GI/Hepatic/Renal/Endo      Musculoskeletal         PE comment: Station and gait is narrow-based and steady there is no focal motor weakness.  Abdominal    Substance History      OB/GYN          Other                 Diagnosis and Plan    Treatment Plan  ASA 2      Procedures: Nerve block type: Left L4-5 transforaminal epidural steroid injection., With fluoroscopy,      Anesthetic plan and risks discussed with patient.      Diagnosis     * Intervertebral disc disorder with radiculopathy of lumbar region [M51.16]

## 2024-09-17 DIAGNOSIS — M51.26 LUMBAR DISC HERNIATION: Primary | ICD-10-CM

## 2024-09-17 RX ORDER — GABAPENTIN 300 MG/1
300 CAPSULE ORAL 2 TIMES DAILY
Qty: 60 CAPSULE | Refills: 5 | Status: SHIPPED | OUTPATIENT
Start: 2024-09-17

## 2024-09-25 ENCOUNTER — TELEPHONE (OUTPATIENT)
Dept: NEUROSURGERY | Facility: CLINIC | Age: 44
End: 2024-09-25
Payer: COMMERCIAL

## 2024-12-12 RX ORDER — GABAPENTIN 300 MG/1
CAPSULE ORAL
Qty: 90 CAPSULE | Refills: 5 | Status: SHIPPED | OUTPATIENT
Start: 2024-12-12

## 2025-05-10 ENCOUNTER — TELEMEDICINE (OUTPATIENT)
Dept: FAMILY MEDICINE CLINIC | Facility: TELEHEALTH | Age: 45
End: 2025-05-10
Payer: COMMERCIAL

## 2025-05-10 DIAGNOSIS — B37.0 THRUSH: Primary | ICD-10-CM

## 2025-05-10 RX ORDER — FLUCONAZOLE 200 MG/1
200 TABLET ORAL DAILY
Qty: 7 TABLET | Refills: 1 | Status: SHIPPED | OUTPATIENT
Start: 2025-05-10 | End: 2025-05-18

## 2025-05-10 NOTE — PATIENT INSTRUCTIONS
Oral Thrush, Adult  Oral thrush is an infection in your mouth and throat and on your tongue. It causes white patches to form in your mouth and on your tongue.  Many cases of thrush are mild. But, sometimes, thrush can be serious. People who have a weak body defense system (immune system) or other diseases can be affected more.  What are the causes?  This condition is caused by a type of fungus called yeast. The fungus is normally present in small amounts in the mouth and nose. If a person has a long-term illness or a weak body defense system, the fungus can grow and spread quickly. This causes thrush.  What increases the risk?  You are more likely to develop this condition if:  You have a weak body defense system.  You are an older adult.  You have diabetes, cancer, or HIV.  You have a dry mouth.  You are pregnant or breastfeeding.  You do not take good care of your teeth. This risk is greater for people who have false teeth (dentures).  You use antibiotic or steroid medicines.  What are the signs or symptoms?  Symptoms of this condition include:  A burning feeling in the mouth and throat.  White patches that stick to the mouth and tongue.  A bad taste in the mouth or trouble tasting foods.  A feeling like you have cotton in your mouth.  Pain when you eat and swallow.  Not wanting to eat as much as usual.  Cracking at the corners of the mouth.  How is this treated?  This condition is treated with medicines called antifungals. These medicines prevent a fungus from growing. The medicines are either put right on the area (topical) or swallowed (oral).  Your doctor will also treat other problems that you may have, such as diabetes or HIV.  Follow these instructions at home:  Helping with pain and soreness  To lessen your pain:  Drink cold liquids, like water and iced tea.  Eat frozen ice pops or frozen juices.  Eat foods that are easy to swallow, like gelatin and ice cream.  Drink from a straw if you have too much pain  in your mouth.     General instructions  Take or use over-the-counter and prescription medicines only as told by your doctor.  Eat plain yogurt that has live cultures in it. Read the label to make sure that there are live cultures in your yogurt.  If you wear false teeth:  Take them out before you go to bed.  Brush them well.  Soak them in a .  Rinse your mouth with warm salt-water many times a day. To make the salt-water mixture, dissolve ½-1 teaspoon (3-6 g) of salt in 1 cup (237 mL) of warm water.  Contact a doctor if:  Your problems do not get better within 7 days of treatment.  Your infection is spreading. This may show as white areas on the skin outside of your mouth.  You are nursing your baby and you have redness and pain in the nipples.  Summary  Oral thrush is an infection in your mouth and throat. It is caused by a fungus.  You are more likely to get this condition if you have a weak body defense system. Diseases like diabetes, cancer, or HIV also add to your risk.  This condition is treated with medicines called antifungals.  Contact a doctor if you do not get better within 7 days of starting treatment.  This information is not intended to replace advice given to you by your health care provider. Make sure you discuss any questions you have with your health care provider.  Document Revised: 12/04/2023 Document Reviewed: 12/04/2023  Elsevier Patient Education © 2024 [employer]

## 2025-05-10 NOTE — PROGRESS NOTES
No chief complaint on file.      Video Visit Reason:   Free Text Description: I have unresolved thrush.  Subjective   Aislinn Marie is a 45 y.o. female.     History of Present Illness  The patient presents via virtual visit for unresolved thrush.    She was previously prescribed antibiotics from a local clinic following an incident where she pricked her finger on a aly bush. Concurrently, she received a tetanus injection and was administered 2 doses of Diflucan. She has observed a pattern of developing thrush during the last 2 to 3 courses of antibiotic treatment. The initial dose of Diflucan was taken midway through the antibiotic course, which alleviated the thrush symptoms. However, the second dose, taken upon completion of the antibiotic course, did not fully resolve the symptoms. Initially mild, the symptoms have since escalated, presenting as a sore throat, oral sores, and dry mouth. Previous attempts to manage the condition with nystatin proved ineffective.    She also recalls a similar episode of thrush following an ablation procedure, during which she was given Keflex. At that time, a 14-day course of Diflucan 200 mg successfully eradicated the thrush.    The following portions of the patient's history were reviewed and updated as appropriate: allergies, current medications, past medical history, and problem list.      Past Medical History:   Diagnosis Date    Arthritis     Essential hypertension 04/07/2016    Hemorrhoid     Low back pain     Lumbar disc herniation     Neurofibroma of lower extremity     RIGHT THIGH    Ocular migraine     Varicose veins      Social History     Socioeconomic History    Marital status:    Tobacco Use    Smoking status: Never    Smokeless tobacco: Never   Vaping Use    Vaping status: Never Used   Substance and Sexual Activity    Alcohol use: Yes     Comment: wine on a rare occasion    Drug use: No    Sexual activity: Defer     Comment: essure     medication  documentation: reviewed and updated with patient and   Current Outpatient Medications:     fluconazole (Diflucan) 200 MG tablet, Take 1 tablet by mouth Daily for 7 days., Disp: 7 tablet, Rfl: 1    fluticasone (FLONASE) 50 MCG/ACT nasal spray, 2 sprays into the nostril(s) as directed by provider Daily., Disp: , Rfl:     gabapentin (NEURONTIN) 300 MG capsule, Take 1 capsule by mouth Every Morning AND 2 capsules Every Evening., Disp: 90 capsule, Rfl: 5    miSOPROStol (Cytotec) 200 MCG tablet, Take 2 tablets by mouth at bedtime the night before the procedure., Disp: 2 tablet, Rfl: 0    norethindrone (AYGESTIN) 5 MG tablet, Take 1 tablet by mouth 3 times a day. Start 3 days before your expected period., Disp: 30 tablet, Rfl: 0    Semaglutide-Weight Management (Wegovy) 0.25 MG/0.5ML solution auto-injector, Inject 0.5 mL under the skin into the appropriate area as directed 1 (One) Time Per Week., Disp: 2 mL, Rfl: 6    TESTOSTERONE NA, into the nostril(s) as directed by provider. Pellets every 4 months, Disp: , Rfl:     traMADol (ULTRAM) 50 MG tablet, Take 1 tablet by mouth Every 4 (Four) to 6 (Six) Hours for pain. Do not exceed 4 tablets per day., Disp: 15 tablet, Rfl: 0    Unable to find, 1 each 1 (One) Time. Med Name: SEMAGLUTIDE/CYANOCOBALAMIN) 1 x week, Disp: , Rfl:     valACYclovir (VALTREX) 1000 MG tablet, Take 2 tablets by mouth at onset. Then take 1 tablet daily As Needed., Disp: 21 tablet, Rfl: 6    valACYclovir (VALTREX) 1000 MG tablet, Take 2 tablets by mouth at onset, then 1 tablet daily as needed., Disp: 21 tablet, Rfl: 6  Review of Systems  See HPI  Objective   Physical Exam  HENT:      Mouth/Throat:      Mouth: Oral lesions present.   Neurological:      Mental Status: She is alert.         Assessment & Plan   Diagnoses and all orders for this visit:    1. Thrush (Primary)  -     fluconazole (Diflucan) 200 MG tablet; Take 1 tablet by mouth Daily for 7 days.  Dispense: 7 tablet; Refill: 1     - Recurrent  thrush symptoms, including sore throat, mouth sores, and dry mouth, following recent antibiotic use.  - Previous treatments with nystatin were ineffective.  - Prescription for Diflucan 200 mg, to be taken orally once daily for 7 days, has been provided.  - A refill is available if necessary, but would not recommend the 14 day course unless the 7 day course is ineffective. Caution is advised due to potential liver impact. Prescription sent to pharmacy.             Follow Up:  If your symptoms are not resolving by the completion of your treatment or are worsening, see your primary care provider for follow up. If you don't have a primary care provider, you may go to any Urgent Care for re-evaluation. If you develop any life threatening symptoms, go to the nearest Emergency Department immediately or call EMS.       Patient or patient representative verbalized consent for the use of Ambient Listening during the visit with  ETHEL Gutierrez for chart documentation. 5/10/2025  17:16 EDT        The use of  Video Visit was utilized during this visit, using both [employer] and [employer]. The use of a video visit has been reviewed with the patient and verbal informed consent has been obtained. No technical difficulties occurred during the visit.    is located at [address] [place] [address]  Provider is located at [place], [place]

## 2025-06-16 RX ORDER — GABAPENTIN 300 MG/1
CAPSULE ORAL
Qty: 90 CAPSULE | Refills: 5 | Status: SHIPPED | OUTPATIENT
Start: 2025-06-16

## 2025-06-16 NOTE — TELEPHONE ENCOUNTER
Rx Refill Note  Requested Prescriptions     Pending Prescriptions Disp Refills    gabapentin (NEURONTIN) 300 MG capsule 90 capsule 5     Sig: Take 1 capsule by mouth Every Morning AND 2 capsules Every Evening.      Last office visit with prescribing clinician: Visit date not found   Last telemedicine visit with prescribing clinician: Visit date not found   Next office visit with prescribing clinician: Visit date not found                         Would you like a call back once the refill request has been completed: [] Yes [] No    If the office needs to give you a call back, can they leave a voicemail: [] Yes [] No    Margaret Tipton MA  06/16/25, 10:41 EDT

## (undated) DEVICE — THE TORRENT IRRIGATION SCOPE CONNECTOR IS USED WITH THE TORRENT IRRIGATION TUBING TO PROVIDE IRRIGATION FLUIDS SUCH AS STERILE WATER DURING GASTROINTESTINAL ENDOSCOPIC PROCEDURES WHEN USED IN CONJUNCTION WITH AN IRRIGATION PUMP (OR ELECTROSURGICAL UNIT).: Brand: TORRENT

## (undated) DEVICE — 3M™ STERI-STRIP™ COMPOUND BENZOIN TINCTURE 40 BAGS/CARTON 4 CARTONS/CASE C1544: Brand: 3M™ STERI-STRIP™

## (undated) DEVICE — ADAPT CLN BIOGUARD AIR/H2O DISP

## (undated) DEVICE — THE SINGLE USE ETRAP – POLYP TRAP IS USED FOR SUCTION RETRIEVAL OF ENDOSCOPICALLY REMOVED POLYPS.: Brand: ETRAP

## (undated) DEVICE — SNAR POLYP CAPTIVATOR RND STFF 2.4 240CM 10MM 1P/U

## (undated) DEVICE — CANN O2 ETCO2 FITS ALL CONN CO2 SMPL A/ 7IN DISP LF

## (undated) DEVICE — TUBING, SUCTION, 1/4" X 10', STRAIGHT: Brand: MEDLINE

## (undated) DEVICE — STPLR SKIN VISISTAT WD 35CT

## (undated) DEVICE — SINGLE-USE BIOPSY FORCEPS: Brand: RADIAL JAW 4

## (undated) DEVICE — LN SMPL CO2 SHTRM SD STREAM W/M LUER

## (undated) DEVICE — SPNG GZ WOVN 4X4IN 12PLY 10/BX STRL

## (undated) DEVICE — BITEBLOCK OMNI BLOC

## (undated) DEVICE — COVERROLL STRETCH 10CMX1.84M WHITE 1: Brand: COVER-ROLL®

## (undated) DEVICE — CANNULA,ADULT,SOFT-TOUCH,7'TUBE,UC: Brand: PENDING

## (undated) DEVICE — DRN JP FLT NO TROC SIL FUL/PERF 7MM

## (undated) DEVICE — RESERVOIR,SUCTION,100CC,SILICONE: Brand: MEDLINE

## (undated) DEVICE — GLV SURG BIOGEL LTX PF 7

## (undated) DEVICE — SENSR O2 OXIMAX FNGR A/ 18IN NONSTR

## (undated) DEVICE — KT ORCA ORCAPOD DISP STRL

## (undated) DEVICE — SNAR POLYP SENSATION STDOVL 27 240 BX40

## (undated) DEVICE — PK ORTHO MINOR TOWER 40

## (undated) DEVICE — Device: Brand: DEFENDO AIR/WATER/SUCTION AND BIOPSY VALVE

## (undated) DEVICE — STPLR SKIN SUBCUTICULAR INSORB 2030

## (undated) DEVICE — 3M™ STERI-STRIP™ REINFORCED ADHESIVE SKIN CLOSURES, R1548, 1 IN X 5 IN (25 MM X 125 MM), 4 STRIPS/ENVELOPE: Brand: 3M™ STERI-STRIP™

## (undated) DEVICE — SKIN PREP TRAY W/CHG: Brand: MEDLINE INDUSTRIES, INC.